# Patient Record
Sex: FEMALE | Race: WHITE | NOT HISPANIC OR LATINO | Employment: OTHER | URBAN - METROPOLITAN AREA
[De-identification: names, ages, dates, MRNs, and addresses within clinical notes are randomized per-mention and may not be internally consistent; named-entity substitution may affect disease eponyms.]

---

## 2018-05-02 ENCOUNTER — OFFICE VISIT (OUTPATIENT)
Dept: OTOLARYNGOLOGY | Facility: CLINIC | Age: 68
End: 2018-05-02
Payer: COMMERCIAL

## 2018-05-02 VITALS — BODY MASS INDEX: 30.78 KG/M2 | HEIGHT: 61 IN | WEIGHT: 163 LBS

## 2018-05-02 DIAGNOSIS — G51.39 HEMIFACIAL SPASM: Primary | ICD-10-CM

## 2018-05-02 ASSESSMENT — PAIN SCALES - GENERAL: PAINLEVEL: NO PAIN (0)

## 2018-05-02 NOTE — LETTER
5/2/2018       RE: Russell Rao  [address]  [facility] [address]     Dear Colleague,    Thank you for referring your patient, Russell Rao, to the [facility] EAR NOSE AND THROAT at [facility]. Please see a copy of my visit note below.    t    Facial Plastic and Reconstructive Surgery Consultation    Referring Provider:   Referred Self, MD  No address on file    HPI:   I had the pleasure of seeing Russell Rao today in clinic for consultation for sequelae from Bell's palsy and facial paralysis recovery.   Russell Rao is a 67 year old female who years ago experienced what is consistent on history with right Bell's palsy. She had slow recovery but was able to obtain a significant amount of facial movement.    She has since noted significant tightness on the right side of her face with unwanted movements. She specifically feels that her eye closes involuntarily and that the right side is uncomfortable. She tries to stretch her face but does not achieve much improvement.    She also notes twitching in her face and tightness in her neck. These are all problems that have been consistent concerns since her recovery from the total facial paralysis.       Review Of Systems  ROS: 10 point ROS neg other than the symptoms noted above in the HPI.    Patient Active Problem List   Diagnosis     Constipation     Migraine headache     Allergic state     S/P hysterectomy     Hyperlipidemia LDL goal <130     Advanced directives, counseling/discussion     Migraine     Obesity     Bell's palsy     Epigastric pain     Eyelid cancer     Major depression, single episode     Symptomatic cholelithiasis     Past Surgical History:   Procedure Laterality Date     HYSTERECTOMY, PAP NO LONGER INDICATED      TVH and prolift repair, ovaries remain     TUBAL LIGATION       Current Outpatient Prescriptions   Medication Sig Dispense Refill     Ibuprofen 200 MG capsule Take 200 mg by  mouth every 4 hours as needed.       LORATADINE 10 MG OR TABS ONE DAILY 90 Tab 3     Multiple Vitamins-Minerals (MULTIVITAL PO)        omega-3 fatty acids (FISH OIL) 1200 MG capsule Take 1 capsule by mouth daily.       simvastatin (ZOCOR) 20 MG tablet Take 1 tablet by mouth At Bedtime. 90 tablet 3     Pollen extract and Pollen [pollen extract]  Social History     Social History     Marital status: Single     Spouse name: N/A     Number of children: N/A     Years of education: N/A     Occupational History     Not on file.     Social History Main Topics     Smoking status: Former Smoker     Types: Cigarettes     Quit date: 2006     Smokeless tobacco: Never Used     Alcohol use No     Drug use: No     Sexual activity: Not Currently     Partners: Male     Birth control/ protection: None     Other Topics Concern     Parent/Sibling W/ Cabg, Mi Or Angioplasty Before 65f 55m? No     Social History Narrative    Dairy/d 1-2 servings/d.     Caffeine 1 servings/d    Exercise 0 x week    Sunscreen used - Yes    Seatbelts used - Yes    Working smoke/CO detectors in the home - Yes    Guns stored in the home - No    Self Breast Exams - Yes    Self Testicular Exam - NA    Eye Exam up to date - No    Dental Exam up to date - No    Pap Smear up to date - No    Mammogram up to date - No    PSA up to date - NA    Dexa Scan up to date - No    Flex Sig / Colonoscopy up to date - No    Immunizations up to date - Yes    Abuse: Current or Past(Physical, Sexual or Emotional)- No    Do you feel safe in your environment - Yes    MAURY Collado CMA                 Family History   Problem Relation Age of Onset     Breast Cancer Maternal Grandmother      Asthma Mother      OSTEOPOROSIS Mother      Hypertension Father      Cancer - colorectal Father      Alcohol/Drug Father      Alzheimer Disease Father      dementia     Hypertension Brother      CEREBROVASCULAR DISEASE Brother           Alcohol/Drug Brother      Lipids Brother       Genitourinary Problems Sister      kidney stones     Gynecology Sister      hyst     CEREBROVASCULAR DISEASE Brother        PE:  Alert and Oriented, Answering Questions Appropriately  Atraumatic, Normocephalic  Face: Notable resting tone asymmetry with a narrow right palpebral aperture and deep right nasolabial fold  Movement of the right corner of the mouth with closure of the eye  Involuntary closure of the eye with pursing of lips and speech  EOM's, PEERL  Skin: Awad 2  Facial Nerve Intact and facial movement symmetric  Chin: Normal   Neck: No lymphadenopathy, no thyromegaly, trachea midline  Chest: No wheezing, cyanosis, or stridor  Card: Normal upper extremity pulses and capillary refill, not diaphoretic  Neuro/Psych: CN's 2-12 intact except for right facial nerve findings, Moves all extremities, ambulation in intact, positive affect, no notable muscle weakness            IMPRESSION:    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   She has significant discomfort and symptoms and would benefit from treatment.   I have discussed chemodenervation with her today and discussed the risks and benefits of this treatment.  I believe that this combined with facial rehabilitation will lead to significant relief.     Photodocumentation was obtained.     I spent a total of 40 minutes face-to-face with Russell Rao during today's office visit.  Over 50% of this time was spent counseling the patient and/or coordinating care regarding her faical symptoms and their treatment.  See note for details.        Again, thank you for allowing me to participate in the care of your patient.      Sincerely,    Kezia Davis MD

## 2018-05-02 NOTE — MR AVS SNAPSHOT
"              After Visit Summary   5/2/2018    Russell Rao    MRN: [id number]           Patient Information     Date Of Birth          1950        Visit Information        Provider Department      5/2/2018 5:20 PM Kezia Davis MD M [facility] Ear Nose and Throat        Today's Diagnoses     Hemifacial spasm    -  1       Follow-ups after your visit        Your next 10 appointments already scheduled     Jun 06, 2018  5:00 PM CDT   (Arrive by 4:45 PM)   RETURN FACIAL PARALYSIS with MD SASHA Doherty [facility] Ear Nose and Throat ([facility])    [address] [address] [phone]              Who to contact     Please call your clinic at [phone] to:    Ask questions about your health    Make or cancel appointments    Discuss your medicines    Learn about your test results    Speak to your doctor            Additional Information About Your Visit        MyChart Information     Editlite gives you secure access to your electronic health record. If you see a primary care provider, you can also send messages to your care team and make appointments. If you have questions, please call your primary care clinic.  If you do not have a primary care provider, please call [phone] and they will assist you.      Editlite is an electronic gateway that provides easy, online access to your medical records. With [employer], you can request a clinic appointment, read your test results, renew a prescription or communicate with your care team.     To access your existing account, please contact your [facility] or call [phone] for assistance.        Care EveryWhere ID     This is your Care EveryWhere ID. This could be used by other organizations to access your [place] medical records  [id number]        Your Vitals Were     Height BMI (Body Mass Index)                1.54 m (5' 0.63\") 31.18 kg/m2           Blood " Pressure from Last 3 Encounters:   09/05/12 118/78   07/30/12 102/64   06/20/12 100/64    Weight from Last 3 Encounters:   05/02/18 73.9 kg (163 lb)   09/05/12 73.5 kg (162 lb 2 oz)   07/30/12 72.6 kg (160 lb)              Today, you had the following     No orders found for display       Primary Care Provider Office Phone # Fax #    Lelo Contreras PA-C [phone]       [address] [facility] [address]        Equal Access to Services     [facility]: Hadii sasha buchanan hadasho Soomaali, waaxda luqadaha, qaybta kaalmada adetala, sophie baumann . So [facility] [phone].    ATENCIÓN: Si habla español, tiene a barney disposición servicios gratuitos de asistencia lingüística. [facility] [phone].    We comply with applicable federal civil rights laws and [place] laws. We do not discriminate on the basis of race, color, national origin, age, disability, sex, sexual orientation, or gender identity.            Thank you!     Thank you for choosing [facility] EAR NOSE AND THROAT  for your care. Our goal is always to provide you with excellent care. Hearing back from our patients is one way we can continue to improve our services. Please take a few minutes to complete the written survey that you may receive in the mail after your visit with us. Thank you!             Your Updated Medication List - Protect others around you: Learn how to safely use, store and throw away your medicines at www.disposemymeds.org.          This list is accurate as of 5/2/18 11:59 PM.  Always use your most recent med list.                   Brand Name Dispense Instructions for use Diagnosis    ibuprofen 200 MG capsule      Take 200 mg by mouth every 4 hours as needed.        loratadine 10 MG tablet    CLARITIN    90 Tab    ONE DAILY    Allergies       MULTIVITAL PO           omega-3 fatty acids 1200 MG capsule      Take 1 capsule by mouth daily.        simvastatin 20 MG tablet    ZOCOR    90 tablet     Take 1 tablet by mouth At Bedtime.

## 2018-05-02 NOTE — NURSING NOTE
"Chief Complaint   Patient presents with     Consult     facial paralysis-bells Palsy      Height 1.54 m (5' 0.63\"), weight 73.9 kg (163 lb).    Erik Antoine LPN    "

## 2018-05-03 NOTE — NURSING NOTE
Video and photodocumentation obtained.  Will obtain PA for Botulinum Toxin for Synkinesis.    Rose Culver RN  5/3/2018 9:23 AM

## 2018-05-11 ENCOUNTER — TELEPHONE (OUTPATIENT)
Dept: OTOLARYNGOLOGY | Facility: CLINIC | Age: 68
End: 2018-05-11

## 2018-05-11 NOTE — TELEPHONE ENCOUNTER
Called patient to inform her that her insurance company does not require a prior auth for Botox for facial paralysis and synkinesis.  She would like to proceed and I scheduled her for June 6th.      Rose Culver RN  5/11/2018 10:35 AM

## 2018-06-06 ENCOUNTER — OFFICE VISIT (OUTPATIENT)
Dept: OTOLARYNGOLOGY | Facility: CLINIC | Age: 68
End: 2018-06-06
Payer: COMMERCIAL

## 2018-06-06 VITALS
DIASTOLIC BLOOD PRESSURE: 85 MMHG | BODY MASS INDEX: 30.58 KG/M2 | HEIGHT: 61 IN | WEIGHT: 162 LBS | SYSTOLIC BLOOD PRESSURE: 149 MMHG

## 2018-06-06 DIAGNOSIS — R13.11 ORAL PHASE DYSPHAGIA: Primary | ICD-10-CM

## 2018-06-06 DIAGNOSIS — G51.39 HEMIFACIAL SPASM: ICD-10-CM

## 2018-06-06 DIAGNOSIS — R47.1 DYSARTHRIA: ICD-10-CM

## 2018-06-06 ASSESSMENT — PAIN SCALES - GENERAL: PAINLEVEL: NO PAIN (0)

## 2018-06-06 NOTE — LETTER
6/6/2018       RE: Russell Rao  [address]  [facility] [address]     Dear Colleague,    Thank you for referring your patient, Russell Rao, to the [facility] EAR NOSE AND THROAT at [facility]. Please see a copy of my visit note below.    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  She has had  treatment with physical therapy and maximized those efforts. She was referred for chemo denervation with botulinum toxin and we discussed the risks and benefits associated.     Procedure: Chemodenervation with Botulinum Toxin A  Indication: Hemifacial Spasm and Hypercontracture  Injector: Dr. Kezia Davis    The skin was cleaned with antimicrobial solution and a topical ice mask was placed.   The patient was asked to systematically engage the muscles in the area to be injected. The tuberculin needles were used for subdermal injection and hemostasis was obtained with digital pressure when needed. The skin was cleaned.     A total of 28 units of Xeomin was injected subdermally. Areas treated were :lower lid, below brow, mentalis, platysma   Please see scanned procedure log.    The patient tolerated the procedure well and there were no complications.      Again, thank you for allowing me to participate in the care of your patient.      Sincerely,    Kezia Davis MD

## 2018-06-06 NOTE — MR AVS SNAPSHOT
"              After Visit Summary   6/6/2018    Russell Rao    MRN: [id number]           Patient Information     Date Of Birth          1950        Visit Information        Provider Department      6/6/2018 5:00 PM Kezia Davis MD [facility] Ear Nose and Throat        Today's Diagnoses     Oral phase dysphagia    -  1    Dysarthria        Hemifacial spasm           Follow-ups after your visit        Additional Services     SPEECH THERAPY REFERRAL       *This therapy referral will be filtered to a centralized scheduling office at [facility] and the patient will receive a call to schedule an appointment at a [place] location most convenient for them. *     [facility] provides Speech Therapy evaluation and treatment and many specialty services across the [place] system.  If requesting a specialty program, please choose from the list below.  If you have not heard from the scheduling office within 2 business days, please call [phone] for all locations, with the exception of [place], please call [phone].       Treatment: Evaluation & Treatment  Speech Treatment Diagnosis: Dysarthria and Oral Phase Dysphagia  Special Instructions: Ewelina Guzmán SLP  Special Programs: Facial Paralysis    Please be aware that coverage of these services is subject to the terms and limitations of your health insurance plan.  Call member services at your health plan with any benefit or coverage questions.      **Note to Provider:  If you are referring outside of [place] for the therapy appointment, please list the name of the location in the \"special instructions\" above, print the referral and give to the patient to schedule the appointment.                  Who to contact     Please call your clinic at [phone] to:    Ask questions about your health    Make or cancel appointments    Discuss your medicines    Learn about your test results    Speak to your doctor         " "   Additional Information About Your Visit        Hang w/hart Information     At[employer] gives you secure access to your electronic health record. If you see a primary care provider, you can also send messages to your care team and make appointments. If you have questions, please call your primary care clinic.  If you do not have a primary care provider, please call [phone] and they will assist you.      Atterocor is an electronic gateway that provides easy, online access to your medical records. With [employer], you can request a clinic appointment, read your test results, renew a prescription or communicate with your care team.     To access your existing account, please contact your [facility] or call [phone] for assistance.        Care EveryWhere ID     This is your Care EveryWhere ID. This could be used by other organizations to access your [place] medical records  [id number]        Your Vitals Were     Height BMI (Body Mass Index)                1.54 m (5' 0.63\") 30.98 kg/m2           Blood Pressure from Last 3 Encounters:   06/06/18 149/85   09/05/12 118/78   07/30/12 102/64    Weight from Last 3 Encounters:   06/06/18 73.5 kg (162 lb)   05/02/18 73.9 kg (163 lb)   09/05/12 73.5 kg (162 lb 2 oz)              We Performed the Following     BOTULINUM TOXIN A PER UNIT     CHEMODENERVATION, FACE NERVE MUSCLE     SPEECH THERAPY REFERRAL        Primary Care Provider Office Phone # Fax #    Lelo Vee Contreras PA-C [phone]       [facility] [address]        Equal Access to Services     [facility] : Hadii sasha ku hadasho Soalanali, waaxda luqadaha, qaybta kaalmada adeegyada, sophie best. So [facility] [phone].    ATENCIÓN: Si habla español, tiene a barney disposición servicios gratuitos de asistencia lingüística. Llame al [phone].    We comply with applicable federal civil rights laws and [place] laws. We do not " discriminate on the basis of race, color, national origin, age, disability, sex, sexual orientation, or gender identity.            Thank you!     Thank you for choosing [facility] EAR NOSE AND THROAT  for your care. Our goal is always to provide you with excellent care. Hearing back from our patients is one way we can continue to improve our services. Please take a few minutes to complete the written survey that you may receive in the mail after your visit with us. Thank you!             Your Updated Medication List - Protect others around you: Learn how to safely use, store and throw away your medicines at www.disposemymeds.org.          This list is accurate as of 6/6/18  6:12 PM.  Always use your most recent med list.                   Brand Name Dispense Instructions for use Diagnosis    ibuprofen 200 MG capsule      Take 200 mg by mouth every 4 hours as needed.        loratadine 10 MG tablet    CLARITIN    90 Tab    ONE DAILY    Allergies       MULTIVITAL PO           omega-3 fatty acids 1200 MG capsule      Take 1 capsule by mouth daily.        simvastatin 20 MG tablet    ZOCOR    90 tablet    Take 1 tablet by mouth At Bedtime.

## 2018-06-06 NOTE — NURSING NOTE
Chief Complaint   Patient presents with     RECHECK     bell's palsy, still has pain     Rebel Nielsen

## 2018-06-06 NOTE — PROGRESS NOTES
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  She has had  treatment with physical therapy and maximized those efforts. She was referred for chemo denervation with botulinum toxin and we discussed the risks and benefits associated.     Procedure: Chemodenervation with Botulinum Toxin A  Indication: Hemifacial Spasm and Hypercontracture  Injector: Dr. Kezia Davis    The skin was cleaned with antimicrobial solution and a topical ice mask was placed.   The patient was asked to systematically engage the muscles in the area to be injected. The tuberculin needles were used for subdermal injection and hemostasis was obtained with digital pressure when needed. The skin was cleaned.     A total of 28 units of Xeomin was injected subdermally. Areas treated were :lower lid, below brow, mentalis, platysma   Please see scanned procedure log.    The patient tolerated the procedure well and there were no complications.

## 2018-06-06 NOTE — NURSING NOTE
Obtained consent for Botulinum Toxin.  See treatment sheet.  Patient will see me in 2 weeks for follow up photos.    Referral placed to Ewelina Guzmán - Facial Rehab Specialist.        PREPROCEDURE:  Botulinum Toxin  Yes Patient ID verified with 2 identifiers (name and  or MRN)  Yes: Procedure and site verified with patient/designee  Yes: Accurate consent documentation in medical record  Yes: Marking not required. Reason: Provider is in continuous attendance with the patient from consent through completion of procedure.     TIME OUT:  Yes: Time-Out performed immediately prior to starting procedure, including verbal and active participation of all team members addressing:  * Correct patient identity  * Confirmed that the correct side and site are marked  * An accurate procedure consent form  * Agreement on the procedure to be done  * Correct patient position  * Relevant images and results are properly labeled and appropriately displayed  * The need to administer antibiotics or fluids for irrigation purposes during the procedure as applicable  * Safety precations based on patient history or medication use    DURING PROCEDURE: Verification of correct person, site, and procedure occurs any time the responsibility for care of the patient is transferred to another member of the care team.               Rose Culver RN  2018 5:16 PM

## 2018-07-11 ENCOUNTER — THERAPY VISIT (OUTPATIENT)
Dept: SPEECH THERAPY | Facility: CLINIC | Age: 68
End: 2018-07-11
Attending: OTOLARYNGOLOGY
Payer: COMMERCIAL

## 2018-07-11 DIAGNOSIS — R13.11 ORAL PHASE DYSPHAGIA: ICD-10-CM

## 2018-07-11 DIAGNOSIS — R47.1 DYSARTHRIA: ICD-10-CM

## 2018-07-11 NOTE — MR AVS SNAPSHOT
After Visit Summary   7/11/2018    Russell Rao    MRN: [id number]           Patient Information     Date Of Birth          1950        Visit Information        Provider Department      7/11/2018 5:00 PM Verónica Guzmán SLP [facility]        Today's Diagnoses     Oral phase dysphagia        Dysarthria           Follow-ups after your visit        Who to contact     Please call your clinic at [phone] to:    Ask questions about your health    Make or cancel appointments    Discuss your medicines    Learn about your test results    Speak to your doctor            Additional Information About Your Visit        MyChart Information     [employer] gives you secure access to your electronic health record. If you see a primary care provider, you can also send messages to your care team and make appointments. If you have questions, please call your primary care clinic.  If you do not have a primary care provider, please call [phone] and they will assist you.      [employer] is an electronic gateway that provides easy, online access to your medical records. With [employer], you can request a clinic appointment, read your test results, renew a prescription or communicate with your care team.     To access your existing account, please contact your [facility] or call [phone] for assistance.        Care EveryWhere ID     This is your Care EveryWhere ID. This could be used by other organizations to access your [place] medical records  [id number]         Blood Pressure from Last 3 Encounters:   06/06/18 149/85   09/05/12 118/78   07/30/12 102/64    Weight from Last 3 Encounters:   06/06/18 73.5 kg (162 lb)   05/02/18 73.9 kg (163 lb)   09/05/12 73.5 kg (162 lb 2 oz)              Today, you had the following     No orders found for display       Primary Care Provider Office Phone # Fax #    Lelo Contreras PA-C [phone]       [address] [place] [address]  IRA  [facility] [address]        Equal Access to Services     [facility] : Hadii aad ku hadjuliojuan Moneali, waaramda luqadaha, qamauriliota kaminniesophie yoon. So [facility] [phone].    ATENCIÓN: Si habla español, tiene a barney disposición servicios gratuitos de asistencia lingüística. Llame al [phone].    We comply with applicable federal civil rights laws and [place] laws. We do not discriminate on the basis of race, color, national origin, age, disability, sex, sexual orientation, or gender identity.            Thank you!     Thank you for choosing [facility]  for your care. Our goal is always to provide you with excellent care. Hearing back from our patients is one way we can continue to improve our services. Please take a few minutes to complete the written survey that you may receive in the mail after your visit with us. Thank you!             Your Updated Medication List - Protect others around you: Learn how to safely use, store and throw away your medicines at www.disposemymeds.org.          This list is accurate as of 7/11/18  6:19 PM.  Always use your most recent med list.                   Brand Name Dispense Instructions for use Diagnosis    ibuprofen 200 MG capsule      Take 200 mg by mouth every 4 hours as needed.        loratadine 10 MG tablet    CLARITIN    90 Tab    ONE DAILY    Allergies       MULTIVITAL PO           omega-3 fatty acids 1200 MG capsule      Take 1 capsule by mouth daily.        simvastatin 20 MG tablet    ZOCOR    90 tablet    Take 1 tablet by mouth At Bedtime.

## 2018-07-11 NOTE — PROGRESS NOTES
" Speech Pathology/Facial Paralysis Evaluation - 07/11/18 1700   Visit Type   Visit Type Initial   Patient Type   Patient Type Adult   General Patient Information   Start Of Care Date 07/11/18   Referring Physician Kezia Davis MD   Orders Eval And Treat   Orders Date 06/06/18   Medical Diagnosis Dysarthria; Oral Phase Dysphagia   Onset Of Illness/injury Or Date Of Surgery (Approximately November, 2012)   Surgical/Medical History Reviewed Yes   Pertinent History Of Current Problem Per patient, \"Went to ER, r/o stroke, they told me I have Bell's Palsy (right), they gave me steroids and an antiviral, I always had where the mouth and I go together (synkinesis), it took a couple weeks for the eye to close. I had Botox in 6/6/18 and now it doesn't close as much when I eat and drink.\" Current concerns: \"my eye is down\" and dealing with synkinesis, facial tightness.   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 Patient has been doing forehead and smile exercises which unfortunately reinforce syninesis, encouraged to discontinue in favor of those given today.    Evaluation Results: Resting Tone and Symmetry/Oral status   Palpebral Fissure - Type of Eye Tone  Wide  (1 mm)   Nasolabial Fold  More Pronounced   Lips  - Type of Lip Tone  Elevated  (slightly)   Chin  - Type of Chin Tone  [place]  (slightly)   Type of Forehead Wrinkles Less   Type of Eye Wrinkles More    Type of Cheek/Mouth Wrinkles More   Oral " Rest Posture WNL  (Patient reports wearing dentures.)   Evaluation Results: Forehead Elevation   Forehead Strength Rating % 75-80%   Forehead - Synkinesis  Zygomaticus;Mentalis   Forehead General Severity of Synkinesis   (slight)   Evaluation Results: Minimal Effort Eye Closure    Complete Yes   Eye Closure Synkinesis   Zygomaticus;Mentalis;Platysma   General Severity of Synkinesis   (slight to mild)   Evaluation Results: Open Mouth Smile    Open Smile Strength Rating % 85%   Open Smile Synkinesis   Orbicularis Occuli;Platysma   Open Smile General Severity of Synkinesis   (slight)   Evaluation Results: Closed Mouth Smile    Closed Mouth Smile Strength Rating % 97%   Closed Mouth Smile Synkinesis   Orbicularis Occuli;Platysma   Closed Mouth Smile General Severity of Synkinesis   (very slight)   Evaluation Results: Snarl   Snarl Strength Rating % 75-80%   Snarl Synkinesis  Orbicularis Occuli;Mentalis   Snarl General Severity of Synkinesis   (slight)   Evaluation Results: Pucker   Strength Rating % 50%   Pucker Synkinesis   Orbicularis Occuli;Risorius;Mentalis   General Severity of Synkinesis   mild   Evaluation Results: Tongue Movement   Tongue Protrusion WNL   Presence of Synkinesis with Lingual Movements Mild to moderate in orbicularis oculi   Evaluation Results: Speech Function   Evaluation Results: Speech Function (Impaired nonverbal communication - see Facial Grading Scorin)   General Severity of Synkinesis with Sound-Lip Rounding None   General Severity of Synkinesis with Sound-Lip Pressure none   General Therapy Interventions   Planned Therapy Interventions Facial Therapy;Improve Facial Tone and Function;Other  (Reduce synkinesis)   Clinical Impressions   Criteria for Skilled Therapeutic Interventions Met yes;treatment indicated   Facial Grading Score 70.8/100   Communication Diagnosis Dysarthria   Swallowing Diagnosis Mild to moderate synkinesis with labial and lingual movments   Rehab Potential good to  achieve stated therapy goal(s)   Therapy Frequency  1 time;per month   Predicted Duration of Therapy Intervention (days/weeks) 9 months or less as indicated  (Option to reduce frequency of visits)   Risks and Benefits of Treatment have been explained yes   Patient, family and/or staff in agreement no   Facial Paralysis Goals   Facial Paralysis Goals 1;2;3   Facial Paralysis Goal 1   Goal Identifier Facial Comfort   Goal Description Patient will report a 25% increase in facial comfort/reduction in facial tightness as compared to status noted on date of evaluation.    Target Date 09/11/18   Facial Paralysis Goal 2   Goal Identifier Facial Function (Reduction in Synkinesis)  for Speech, Oral Phase Swallowing and Nonverbal Communication   Goal Description Patient will demonstrate a 10 point gain on her Facial Grading Score, per therapist judgement, reflecting gains in orofacial resting tone, voluntary movement and minimization of synkinesis if present.    Target Date 09/11/18   Education Assessment   Preferred Learning Style Listening;Reading;Demonstration;Pictures/video   Barriers to Learning No barriers   Total Evaluation Time   Total Evaluation Time 45

## 2018-09-10 ENCOUNTER — TELEPHONE (OUTPATIENT)
Dept: OTOLARYNGOLOGY | Facility: CLINIC | Age: 68
End: 2018-09-10

## 2018-10-03 ENCOUNTER — OFFICE VISIT (OUTPATIENT)
Dept: OTOLARYNGOLOGY | Facility: CLINIC | Age: 68
End: 2018-10-03
Payer: COMMERCIAL

## 2018-10-03 VITALS — BODY MASS INDEX: 30.58 KG/M2 | HEIGHT: 61 IN | WEIGHT: 162 LBS

## 2018-10-03 DIAGNOSIS — H53.483 VISUAL FIELD CONSTRICTION, BILATERAL: ICD-10-CM

## 2018-10-03 DIAGNOSIS — G51.39 HEMIFACIAL SPASM: Primary | ICD-10-CM

## 2018-10-03 NOTE — LETTER
10/3/2018       RE: Russell Rao  [address]  [facility] [address]     Dear Colleague,    Thank you for referring your patient, Russell Rao, to the [facility] EAR NOSE AND THROAT at [facility]. Please see a copy of my visit note below.    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  She has had this treatment in june with significant therapeutic effect and improvement and is now 4 months from treatment.  It is timely that she necessitate another treatment with chemodenervation today.     In addition she has significant complaints associated with visual obstruction  Russell describes upper lids interfering with superior visual field and interfering with activities of daily living including reading, driving and watching television. This is quite bothersome.     EXAM:    MRD1: Right eye 0   Left eye 0  Dermatochalasis with excess skin touching the eyelids  Brow ptosis with brow resting below the superior orbital rim  Lids resting on eyelashes obstructing the temporal visual axis          Procedure: Chemodenervation with Botulinum Toxin A  Indication: Hemifacial Spasm and Hypercontracture  Injector: Dr. Kezia Davis    The skin was cleaned with antimicrobial solution and a topical ice mask was placed.   The patient was asked to systematically engage the muscles in the area to be injected. The tuberculin needles were used for subdermal injection and hemostasis was obtained with digital pressure when needed. The skin was cleaned.     A total of 31.5 units was injected subdermally.   Please see scanned procedure log.    The patient tolerated the procedure well and there were no complications.    Assessment and Plan:    Dermatochalasis of upper eyelids    Refer for visual field testing  I believe she would be a good surgical  candidate, the excess skin and laxity with her facial weakness make her quite symptomatic.    I spent a total of 10 minutes face-to-face with Russell Rao during today's office visit.  Over 50% of this time was spent counseling the patient and/or coordinating care regarding visual obstruction.  See note for details.        Kezia Davis MD

## 2018-10-03 NOTE — NURSING NOTE
"Chief Complaint   Patient presents with     RECHECK     bells palsy     Height 1.549 m (5' 1\"), weight 73.5 kg (162 lb).    Jagdeep Smith    "

## 2018-10-03 NOTE — NURSING NOTE
Patient tolerated Botulinum Toxin procedure well - see treatment sheet.  Will coordinate for patient to have VFT done.    Patient to follow up with me after she has had VF done.    Rose Culver RN  10/3/2018 6:18 PM

## 2018-10-03 NOTE — MR AVS SNAPSHOT
"              After Visit Summary   10/3/2018    Russell Rao    MRN: [id number]           Patient Information     Date Of Birth          1950        Visit Information        Provider Department      10/3/2018 5:00 PM Kezia Davis MD [facility] Ear Nose and Throat        Today's Diagnoses     Hemifacial spasm    -  1    Visual field constriction, bilateral           Follow-ups after your visit        Your next 10 appointments already scheduled     Jan 02, 2019  5:20 PM CST   (Arrive by 5:05 PM)   Return Visit with MD SASHA Doherty [facility] Ear Nose and Throat ([facility])    [address] [address] [phone]              Who to contact     Please call your clinic at [phone] to:    Ask questions about your health    Make or cancel appointments    Discuss your medicines    Learn about your test results    Speak to your doctor            Additional Information About Your Visit        MyChart Information     [facility] gives you secure access to your electronic health record. If you see a primary care provider, you can also send messages to your care team and make appointments. If you have questions, please call your primary care clinic.  If you do not have a primary care provider, please call [phone] and they will assist you.      [facility] is an electronic gateway that provides easy, online access to your medical records. With [facility], you can request a clinic appointment, read your test results, renew a prescription or communicate with your care team.     To access your existing account, please contact your [facility] or call [phone] for assistance.        Care EveryWhere ID     This is your Care EveryWhere ID. This could be used by other organizations to access your Advance medical records  [id number]        Your Vitals Were     Height BMI (Body Mass Index)                1.549 m (5' 1\") " 30.61 kg/m2           Blood Pressure from Last 3 Encounters:   06/06/18 149/85   09/05/12 118/78   07/30/12 102/64    Weight from Last 3 Encounters:   10/03/18 73.5 kg (162 lb)   06/06/18 73.5 kg (162 lb)   05/02/18 73.9 kg (163 lb)              We Performed the Following     CHEMODENERVATION, FACE NERVE MUSCLE     Xeomin Injection Hemifacial Spasm        Primary Care Provider Office Phone # Fax #    Lelo Vee Contreras PA-C [phone] [address] [facility] [address]        Equal Access to Services     [facility] : Hadii sasha Romo, waaxda luaryanadaha, qaybta kaalmada david, sophie best. So [facility] [phone].    ATENCIÓN: Si habla español, tiene a barney disposición servicios gratuitos de asistencia lingüística. Ll[facility] [phone].    We comply with applicable federal civil rights laws and [place] laws. We do not discriminate on the basis of race, color, national origin, age, disability, sex, sexual orientation, or gender identity.            Thank you!     Thank you for choosing [facility] EAR NOSE AND THROAT  for your care. Our goal is always to provide you with excellent care. Hearing back from our patients is one way we can continue to improve our services. Please take a few minutes to complete the written survey that you may receive in the mail after your visit with us. Thank you!             Your Updated Medication List - Protect others around you: Learn how to safely use, store and throw away your medicines at www.disposemymeds.org.          This list is accurate as of 10/3/18 11:59 PM.  Always use your most recent med list.                   Brand Name Dispense Instructions for use Diagnosis    ibuprofen 200 MG capsule    ADVIL/MOTRIN     Take 200 mg by mouth every 4 hours as needed.        loratadine 10 MG tablet    CLARITIN    90 Tab    ONE DAILY    Allergies       MULTIVITAL PO           omega-3 fatty acids 1200 MG capsule       Take 1 capsule by mouth daily.        simvastatin 20 MG tablet    ZOCOR    90 tablet    Take 1 tablet by mouth At Bedtime.

## 2018-10-10 ENCOUNTER — TELEPHONE (OUTPATIENT)
Dept: OPHTHALMOLOGY | Facility: CLINIC | Age: 68
End: 2018-10-10

## 2018-10-10 NOTE — TELEPHONE ENCOUNTER
Left VM for patient to call me back to schedule PVF for kaushik sprague    Shannanelli Fisher October 10, 2018 10:17 AM

## 2018-10-15 ENCOUNTER — ALLIED HEALTH/NURSE VISIT (OUTPATIENT)
Dept: OPHTHALMOLOGY | Facility: CLINIC | Age: 68
End: 2018-10-15
Payer: COMMERCIAL

## 2018-10-15 DIAGNOSIS — H02.413 MECHANICAL PTOSIS OF EYELID OF BOTH EYES: Primary | ICD-10-CM

## 2018-11-25 PROBLEM — H53.483 VISUAL FIELD CONSTRICTION, BILATERAL: Status: ACTIVE | Noted: 2018-11-25

## 2018-11-26 NOTE — PROGRESS NOTES
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  She has had this treatment in june with significant therapeutic effect and improvement and is now 4 months from treatment.  It is timely that she necessitate another treatment with chemodenervation today.     In addition she has significant complaints associated with visual obstruction  Russell describes upper lids interfering with superior visual field and interfering with activities of daily living including reading, driving and watching television. This is quite bothersome.     EXAM:    MRD1: Right eye 0   Left eye 0  Dermatochalasis with excess skin touching the eyelids  Brow ptosis with brow resting below the superior orbital rim  Lids resting on eyelashes obstructing the temporal visual axis          Procedure: Chemodenervation with Botulinum Toxin A  Indication: Hemifacial Spasm and Hypercontracture  Injector: Dr. Kezia Davis    The skin was cleaned with antimicrobial solution and a topical ice mask was placed.   The patient was asked to systematically engage the muscles in the area to be injected. The tuberculin needles were used for subdermal injection and hemostasis was obtained with digital pressure when needed. The skin was cleaned.     A total of 31.5 units was injected subdermally.   Please see scanned procedure log.    The patient tolerated the procedure well and there were no complications.    Assessment and Plan:    Dermatochalasis of upper eyelids    Refer for visual field testing  I believe she would be a good surgical candidate, the excess skin and laxity with her facial weakness make her quite symptomatic.    I spent a total of 10 minutes face-to-face with Russell Rao during today's office visit.  Over 50% of this time was spent counseling the patient and/or coordinating care regarding visual obstruction.   See note for details.

## 2018-12-18 ENCOUNTER — DOCUMENTATION ONLY (OUTPATIENT)
Dept: OTOLARYNGOLOGY | Facility: CLINIC | Age: 68
End: 2018-12-18

## 2018-12-18 DIAGNOSIS — H53.40 VISUAL FIELD DEFECT: ICD-10-CM

## 2018-12-18 DIAGNOSIS — H02.839 DERMATOCHALASIS: Primary | ICD-10-CM

## 2019-01-09 ENCOUNTER — OFFICE VISIT (OUTPATIENT)
Dept: OTOLARYNGOLOGY | Facility: CLINIC | Age: 69
End: 2019-01-09
Payer: COMMERCIAL

## 2019-01-09 ENCOUNTER — THERAPY VISIT (OUTPATIENT)
Dept: SPEECH THERAPY | Facility: CLINIC | Age: 69
End: 2019-01-09
Payer: COMMERCIAL

## 2019-01-09 DIAGNOSIS — R13.11 ORAL PHASE DYSPHAGIA: Primary | ICD-10-CM

## 2019-01-09 DIAGNOSIS — G51.39 HEMIFACIAL SPASM: Primary | ICD-10-CM

## 2019-01-09 DIAGNOSIS — R47.89 OTHER SPEECH DISTURBANCES: ICD-10-CM

## 2019-01-09 DIAGNOSIS — R47.1 DYSARTHRIA: ICD-10-CM

## 2019-01-09 NOTE — PROGRESS NOTES
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  She has had this treatment in right with significant therapeutic effect and improvement and is now 3 months from treatment.  It is timely that she necessitate another treatment with chemodenervation today.     Procedure: Chemodenervation with Botulinum Toxin A  Indication: Hemifacial Spasm and Hypercontracture  Injector: Dr. Kezia Davis    The skin was cleaned with antimicrobial solution and a topical ice mask was placed.   The patient was asked to systematically engage the muscles in the area to be injected. The tuberculin needles were used for subdermal injection and hemostasis was obtained with digital pressure when needed. The skin was cleaned.     A total of 40 units was injected subdermally.   Please see scanned procedure log.    The patient tolerated the procedure well and there were no complications.

## 2019-01-09 NOTE — LETTER
1/9/2019       RE: Russell Rao  [address]  [facility] [address]     Dear Colleague,    Thank you for referring your patient, Russell Rao, to the [facility] EAR NOSE AND THROAT at [facility]. Please see a copy of my visit note below.    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  She has had this treatment in right with significant therapeutic effect and improvement and is now 3 months from treatment.  It is timely that she necessitate another treatment with chemodenervation today.     Procedure: Chemodenervation with Botulinum Toxin A  Indication: Hemifacial Spasm and Hypercontracture  Injector: Dr. Kezia Davis    The skin was cleaned with antimicrobial solution and a topical ice mask was placed.   The patient was asked to systematically engage the muscles in the area to be injected. The tuberculin needles were used for subdermal injection and hemostasis was obtained with digital pressure when needed. The skin was cleaned.     A total of 40 units was injected subdermally.   Please see scanned procedure log.    The patient tolerated the procedure well and there were no complications.      Again, thank you for allowing me to participate in the care of your patient.      Sincerely,    Kezia Davis MD

## 2019-01-10 NOTE — NURSING NOTE
Updated photodocumentation obtained - see media tab.     Obtained consent for Botulinum Toxin.  Discussed possible side effects including but not limited to unwanted facial weakness or paralysis, bruising, swelling, redness, pain, upper eyelid ptosis.  Botox will begin to work in 4 - 5 days, fully working by 10 days.  Botox lasts for approximately 3 months.      Prepped skin with isopropyl alcohol and gauze, intermittent ice for comfort.  Patient tolerated procedure well.      Prior auth for upper eyelid blepharoplasty has been submitted.      Patient to follow up in 3 months for Botox.      Rose Culver RN  1/9/2019 6:55 PM

## 2019-01-18 ENCOUNTER — TELEPHONE (OUTPATIENT)
Dept: OTOLARYNGOLOGY | Facility: CLINIC | Age: 69
End: 2019-01-18

## 2019-01-18 NOTE — TELEPHONE ENCOUNTER
Patient is scheduled for surgery with Dr. Davis.  Spoke or left message with: Patient  Date of Surgery: 2/21/19  Location: [facility]  Informed patient they will need an adult  Yes  Pre-op with surgeon (if applicable): N/A  H&P: Scheduled with PCP @ [facility]  Additional imaging/appointments: N/A  Surgery packet: Mailed 1/18/19  Additional comments: Postop appt 3/6/19

## 2019-01-25 DIAGNOSIS — G51.0 FACIAL PARALYSIS: ICD-10-CM

## 2019-01-25 DIAGNOSIS — R13.10 DYSPHAGIA: ICD-10-CM

## 2019-01-25 DIAGNOSIS — R47.89 OTHER SPEECH DISTURBANCE: Primary | ICD-10-CM

## 2019-01-25 DIAGNOSIS — R47.1 DYSARTHRIA: ICD-10-CM

## 2019-01-28 NOTE — PROGRESS NOTES
"   Speech Pathology Facial Paralysis Re-Evaluation - 01/09/19 1600   Signing Clinician's Name / Credentials   Signing clinician's name /credentials CANELO Webster, SLP   Session Number   Session Number 1   Quick Add   Facial Paralysis Facial Paralysis   Subjective Report   Subjective Report \"I had Botox today and they're looking into lifting the eyelids. She did the neck, lips eye. The eye doesn't close when I move my lips, that's the big difference. I went to my eye doctor and my cornea is good. I did the exercises for a while and then I got away from them. People see a difference. \"   Resting Symmetry Location    Palpebral Fissure Eye Tone Normal   Nasolabial Fold More Pronounced   Lips Elevated  (Slightly, improved somewhat)   Voluntary Movement: Minimal Effort Eye Closure    Minimal Effort Eye Closure Complete Yes   Minimal Effort Eye Closure-Synkinesis Levators  (significantly reduced)   General Severity of Synkinesis (slight)   Voluntary Movement: Forehead   Forehead Elevation  Strength Rating % 75-80%   Forehead-Synkinesis Zygomaticus;Mentalis   General Severity of Synkinesis (slight)   Voluntary Movement: Open Mouth Smile   Open Mouth Smile Strength Rating% 85%   Open Mouth Smile-Synkinesis Orbicularis Occuli;Platysma   General Severity of Synkinesis (slight to mild)   Voluntary Movement: Closed Mouth Smile   Closed Mouth Smile Strength Rating % 90%   Closed Mouth Smile-Synkinesis Orbicularis Occuli;Platysma   General Severity of Synkinesis (slight)   Voluntary Movement: Snarl   Snarl Strength Rating % 75-80%   Snarl-Synkinesis Orbicularis Occuli;Platysma   General Severity of Synkinesis (Mild)   Voluntary Movement: Pucker   Pucker Strength Rating % 65%  (improved)   Pucker-Synkinesis Orbicularis Occuli;Mentalis   General Severity of Synkinesis (slight, improved)   Facial Paralysis Goals   Facial Paralysis Goals 1;2;3   Facial Paralysis Goal 1   Goal Identifier Facial Comfort   Goal Description Patient " "will report a 25% increase in facial comfort/reduction in facial tightness as compared to status noted on date of evaluation.   (Pt states she feels completely comfortable now. )   Target Date 09/11/18   Date Met 01/09/19   Facial Paralysis Goal 2   Goal Identifier Facial Function (Reduction in Synkinesis)  for Speech, Oral Phase Swallowing and Nonverbal Communication   Goal Description Patient will demonstrate a 10 point gain on her Facial Grading Score, per therapist judgement, reflecting gains in orofacial resting tone, voluntary movement and minimization of synkinesis if present.   (Increased 5.8 points)   Target Date 04/09/19   Treatment Interventions    Treatment Interventions Treatment Speech/Lang/Voice   Treatment Speech/Lang/Voice   GROUP Language & Auditory Processing Therapy Minutes (95692) 45   Skilled Intervention (Reasssessed facial status. Instructed in facial strategies. )   Patient Response Verbalized and demostrated understanding.    Treatment Detail Completed Facial Grading Scoring. Reviewed and practiced wheel massage and introduced cheek pinches, pulls etc but told pt not to do any massage until 10 days to 2 weeks have passed since current Botox treatment. Instructed in addtional strengthening exercises, see below, and practiced them.   (Tongue sweep ex with eyes shift, creep up technique)   Strengthening   Facial Relaxation Active   Pucker Active;Include Eye Shift  (\"Creep up\" technique)   Lip Press Active;Include Eye Shift  (\"Creep up\" technique)   Lower Lip Depression Active;Include Eye Shift  (\"Creep up\" technique)   Snarl Plus Smile Active Assisted   Assessments Completed   Facial Grading Score 76.6/100   Education   Learner Patient   Readiness Eager   Method Booklet/handout;Explanation;Demonstration   Response Verbalizes understanding;Demonstrates understanding   Plan   Home program See Treatment Detail and Strengthening sections above.    Plan for next session Reassess status and advance " home practice program as indicated.    Total Session Time   Timed Code Treatment Minutes 45   AMBULATORY CLINICS ONLY-MEDICAL AND TREATMENT DIAGNOSIS   Medical Diagnosis Dysarthria; Oral Phase Dysphagia; Other Speech Disturbances   Communication Diagnosis Dysarthria; Other Speech Disturbances

## 2019-02-19 ENCOUNTER — ANESTHESIA EVENT (OUTPATIENT)
Dept: SURGERY | Facility: AMBULATORY SURGERY CENTER | Age: 69
End: 2019-02-19

## 2019-02-20 NOTE — ANESTHESIA PREPROCEDURE EVALUATION
"Anesthesia Pre-Procedure Evaluation    Patient: Russell Rao   MRN:     [id number] Gender:   female   Age:    68 year old :      1950        Preoperative Diagnosis: Dermatochalasis, Visual Field Obstruction   Procedure(s):  Bilateral Upper Eyelid Blepharoplasty     Past Medical History:   Diagnosis Date     Allergic rhinitis      Arthritis      Migraine headaches 2009     Other and unspecified hyperlipidemia      PONV (postoperative nausea and vomiting)       Past Surgical History:   Procedure Laterality Date     HYSTERECTOMY, PAP NO LONGER INDICATED      TVH and prolift repair, ovaries remain     TUBAL LIGATION                 [facility] AN PHYSICAL EXAM    Lab Results   Component Value Date    WBC 6.6 2006    HGB 10.2 (L) 2007    HCT 37.5 2006     2006     2006    POTASSIUM 3.5 2006    CHLORIDE 111 (H) 2006    CO2 23 2006    BUN 13 2006    CR 0.56 (L) 2006    GLC 98 2010    KLEVER 9.1 2006    ALBUMIN 3.9 2006    PROTTOTAL 7.5 2006    ALT 17 10/11/2011    AST 20 2006    ALKPHOS 78 2006    BILITOTAL 0.3 2006    TSH 0.81 2010       Preop Vitals  BP Readings from Last 3 Encounters:   18 149/85   12 118/78   12 102/64    Pulse Readings from Last 3 Encounters:   12 72   12 68   12 64      Resp Readings from Last 3 Encounters:   12 12   12 12   09 16    SpO2 Readings from Last 3 Encounters:   No data found for SpO2      Temp Readings from Last 1 Encounters:   12 36.6  C (97.9  F) (Oral)    Ht Readings from Last 1 Encounters:   10/03/18 1.549 m (5' 1\")      Wt Readings from Last 1 Encounters:   10/03/18 73.5 kg (162 lb)    Estimated body mass index is 30.61 kg/m  as calculated from the following:    Height as of 10/3/18: 1.549 m (5' 1\").    Weight as of 10/3/18: 73.5 kg (162 lb).     LDA:            Assessment:   ASA SCORE: 2    NPO " Status: > 6 hours since completed Solid Foods; > 2 hours since completed Clear Liquids   Documentation: H&P complete; Preop Testing complete; Consents complete   Proceeding: Proceed without further delay  Tobacco Use:  Active user of Tobacco     Plan:   Anes. Type:  MAC   Pre-Induction: Midazolam IV   Induction:  IV (Standard)   Airway: Native Airway   Access/Monitoring: PIV   Maintenance: Propofol; IV   Emergence: Procedure Site   Logistics: Same Day Surgery     Postop Pain/Sedation Strategy:  Standard-Options: Opioids PRN     PONV Management:  Adult Risk Factors: Female, H/o PONV or Motion Sickness, Postop Opioids  Prevention: Propofol Infusion; Dexamethasone; Ondansetron     CONSENT: Direct conversation   Plan and risks discussed with: Patient          Comments for Plan/Consent:  MAC + LOCAL                     ANESTHESIA PREOP EVALUATION    PROCEDURE: Procedure(s):  Bilateral Upper Eyelid Blepharoplasty    HPI: Russell Rao is a 68 year old female who presents for above procedure 2/    PAST MEDICAL HISTORY:    Past Medical History:   Diagnosis Date     Allergic rhinitis      Arthritis      Migraine headaches 2009     Other and unspecified hyperlipidemia      PONV (postoperative nausea and vomiting)        PAST SURGICAL HISTORY:    Past Surgical History:   Procedure Laterality Date     HYSTERECTOMY, PAP NO LONGER INDICATED      TVH and prolift repair, ovaries remain     TUBAL LIGATION         PAST ANESTHESIA HISTORY:     No personal or family h/o anesthesia problems    SOCIAL HISTORY:       Social History     Tobacco Use     Smoking status: Former Smoker     Types: Cigarettes     Last attempt to quit: 2006     Years since quittin.0     Smokeless tobacco: Never Used   Substance Use Topics     Alcohol use: No       ALLERGIES:     Allergies   Allergen Reactions     Pollen Extract      Other reaction(s): Unknown     Pollen [Pollen Extract]        MEDICATIONS:       (Not in a hospital  admission)    Current Outpatient Medications   Medication Sig Dispense Refill     Ibuprofen 200 MG capsule Take 200 mg by mouth every 4 hours as needed.       LORATADINE 10 MG OR TABS ONE DAILY 90 Tab 3     Multiple Vitamins-Minerals (MULTIVITAL PO)        omega-3 fatty acids (FISH OIL) 1200 MG capsule Take 1 capsule by mouth daily.       simvastatin (ZOCOR) 20 MG tablet Take 1 tablet by mouth At Bedtime. 90 tablet 3       Current Outpatient Medications Ordered in Epic   Medication Sig Dispense Refill     Ibuprofen 200 MG capsule Take 200 mg by mouth every 4 hours as needed.       LORATADINE 10 MG OR TABS ONE DAILY 90 Tab 3     Multiple Vitamins-Minerals (MULTIVITAL PO)        omega-3 fatty acids (FISH OIL) 1200 MG capsule Take 1 capsule by mouth daily.       simvastatin (ZOCOR) 20 MG tablet Take 1 tablet by mouth At Bedtime. 90 tablet 3     No current Epic-ordered facility-administered medications on file.        PHYSICAL EXAM:    Vitals: T Data Unavailable, P Data Unavailable, BP Data Unavailable, R Data Unavailable, SpO2  , Weight Wt Readings from Last 2 Encounters:   10/03/18 73.5 kg (162 lb)   06/06/18 73.5 kg (162 lb)     See doc flowsheet    NPO STATUS: see doc flowsheet    LABS:    BMP:  No results for input(s): NA, POTASSIUM, CHLORIDE, CO2, BUN, CR, GLC, KLEVER in the last 69267 hours.    LFTs:   Recent Labs   Lab Test 10/11/11  1003   ALT 17       CBC:   No results for input(s): WBC, RBC, HGB, HCT, MCV, MCH, MCHC, RDW, PLT in the last 43866 hours.    Coags:  No results for input(s): INR, PTT, FIBR in the last 89168 hours.    Imaging:  No orders to display       Thierno Burgos MD  Anesthesiology Staff  Pager [phone]    2/19/2019  8:06 PM        Thierno Burgos MD

## 2019-02-21 ENCOUNTER — ANESTHESIA (OUTPATIENT)
Dept: SURGERY | Facility: AMBULATORY SURGERY CENTER | Age: 69
End: 2019-02-21

## 2019-02-21 ENCOUNTER — HOSPITAL ENCOUNTER (OUTPATIENT)
Facility: AMBULATORY SURGERY CENTER | Age: 69
End: 2019-02-21
Attending: OTOLARYNGOLOGY
Payer: COMMERCIAL

## 2019-02-21 VITALS
DIASTOLIC BLOOD PRESSURE: 71 MMHG | HEART RATE: 67 BPM | RESPIRATION RATE: 16 BRPM | WEIGHT: 162 LBS | SYSTOLIC BLOOD PRESSURE: 129 MMHG | TEMPERATURE: 97.5 F | HEIGHT: 62 IN | BODY MASS INDEX: 29.81 KG/M2 | OXYGEN SATURATION: 95 %

## 2019-02-21 DIAGNOSIS — H53.483 VISUAL FIELD CONSTRICTION, BILATERAL: Primary | ICD-10-CM

## 2019-02-21 RX ORDER — CEFAZOLIN SODIUM 2 G/50ML
2 SOLUTION INTRAVENOUS
Status: COMPLETED | OUTPATIENT
Start: 2019-02-21 | End: 2019-02-21

## 2019-02-21 RX ORDER — MEPERIDINE HYDROCHLORIDE 25 MG/ML
12.5 INJECTION INTRAMUSCULAR; INTRAVENOUS; SUBCUTANEOUS
Status: DISCONTINUED | OUTPATIENT
Start: 2019-02-21 | End: 2019-02-22 | Stop reason: HOSPADM

## 2019-02-21 RX ORDER — ONDANSETRON 2 MG/ML
INJECTION INTRAMUSCULAR; INTRAVENOUS PRN
Status: DISCONTINUED | OUTPATIENT
Start: 2019-02-21 | End: 2019-02-21

## 2019-02-21 RX ORDER — PROPOFOL 10 MG/ML
INJECTION, EMULSION INTRAVENOUS CONTINUOUS PRN
Status: DISCONTINUED | OUTPATIENT
Start: 2019-02-21 | End: 2019-02-21

## 2019-02-21 RX ORDER — OXYCODONE HYDROCHLORIDE 5 MG/1
5 TABLET ORAL EVERY 4 HOURS PRN
Status: DISCONTINUED | OUTPATIENT
Start: 2019-02-21 | End: 2019-02-22 | Stop reason: HOSPADM

## 2019-02-21 RX ORDER — ERYTHROMYCIN 5 MG/G
OINTMENT OPHTHALMIC PRN
Status: DISCONTINUED | OUTPATIENT
Start: 2019-02-21 | End: 2019-02-21 | Stop reason: HOSPADM

## 2019-02-21 RX ORDER — ACETAMINOPHEN 325 MG/1
650 TABLET ORAL ONCE
Status: COMPLETED | OUTPATIENT
Start: 2019-02-21 | End: 2019-02-21

## 2019-02-21 RX ORDER — ERYTHROMYCIN 5 MG/G
0.5 OINTMENT OPHTHALMIC 3 TIMES DAILY
Qty: 3.5 G | Refills: 1 | Status: SHIPPED | OUTPATIENT
Start: 2019-02-21 | End: 2019-03-03

## 2019-02-21 RX ORDER — FENTANYL CITRATE 50 UG/ML
INJECTION, SOLUTION INTRAMUSCULAR; INTRAVENOUS PRN
Status: DISCONTINUED | OUTPATIENT
Start: 2019-02-21 | End: 2019-02-21

## 2019-02-21 RX ORDER — LIDOCAINE 40 MG/G
CREAM TOPICAL
Status: DISCONTINUED | OUTPATIENT
Start: 2019-02-21 | End: 2019-02-21 | Stop reason: HOSPADM

## 2019-02-21 RX ORDER — NALOXONE HYDROCHLORIDE 0.4 MG/ML
.1-.4 INJECTION, SOLUTION INTRAMUSCULAR; INTRAVENOUS; SUBCUTANEOUS
Status: DISCONTINUED | OUTPATIENT
Start: 2019-02-21 | End: 2019-02-22 | Stop reason: HOSPADM

## 2019-02-21 RX ORDER — LIDOCAINE HYDROCHLORIDE 20 MG/ML
INJECTION, SOLUTION INFILTRATION; PERINEURAL PRN
Status: DISCONTINUED | OUTPATIENT
Start: 2019-02-21 | End: 2019-02-21

## 2019-02-21 RX ORDER — SODIUM CHLORIDE, SODIUM LACTATE, POTASSIUM CHLORIDE, CALCIUM CHLORIDE 600; 310; 30; 20 MG/100ML; MG/100ML; MG/100ML; MG/100ML
INJECTION, SOLUTION INTRAVENOUS CONTINUOUS
Status: DISCONTINUED | OUTPATIENT
Start: 2019-02-21 | End: 2019-02-21 | Stop reason: HOSPADM

## 2019-02-21 RX ORDER — DEXAMETHASONE SODIUM PHOSPHATE 4 MG/ML
INJECTION, SOLUTION INTRA-ARTICULAR; INTRALESIONAL; INTRAMUSCULAR; INTRAVENOUS; SOFT TISSUE PRN
Status: DISCONTINUED | OUTPATIENT
Start: 2019-02-21 | End: 2019-02-21

## 2019-02-21 RX ORDER — SODIUM CHLORIDE, SODIUM LACTATE, POTASSIUM CHLORIDE, CALCIUM CHLORIDE 600; 310; 30; 20 MG/100ML; MG/100ML; MG/100ML; MG/100ML
INJECTION, SOLUTION INTRAVENOUS CONTINUOUS
Status: DISCONTINUED | OUTPATIENT
Start: 2019-02-21 | End: 2019-02-22 | Stop reason: HOSPADM

## 2019-02-21 RX ORDER — ONDANSETRON 2 MG/ML
4 INJECTION INTRAMUSCULAR; INTRAVENOUS EVERY 30 MIN PRN
Status: DISCONTINUED | OUTPATIENT
Start: 2019-02-21 | End: 2019-02-22 | Stop reason: HOSPADM

## 2019-02-21 RX ORDER — KETOROLAC TROMETHAMINE 30 MG/ML
INJECTION, SOLUTION INTRAMUSCULAR; INTRAVENOUS PRN
Status: DISCONTINUED | OUTPATIENT
Start: 2019-02-21 | End: 2019-02-21

## 2019-02-21 RX ORDER — CEFAZOLIN SODIUM 1 G/50ML
1 SOLUTION INTRAVENOUS SEE ADMIN INSTRUCTIONS
Status: DISCONTINUED | OUTPATIENT
Start: 2019-02-21 | End: 2019-02-21 | Stop reason: HOSPADM

## 2019-02-21 RX ORDER — ACETAMINOPHEN 325 MG/1
650 TABLET ORAL
Status: DISCONTINUED | OUTPATIENT
Start: 2019-02-21 | End: 2019-02-22 | Stop reason: HOSPADM

## 2019-02-21 RX ORDER — PROPOFOL 10 MG/ML
INJECTION, EMULSION INTRAVENOUS PRN
Status: DISCONTINUED | OUTPATIENT
Start: 2019-02-21 | End: 2019-02-21

## 2019-02-21 RX ORDER — ONDANSETRON 4 MG/1
4 TABLET, ORALLY DISINTEGRATING ORAL EVERY 30 MIN PRN
Status: DISCONTINUED | OUTPATIENT
Start: 2019-02-21 | End: 2019-02-22 | Stop reason: HOSPADM

## 2019-02-21 RX ADMIN — FENTANYL CITRATE 25 MCG: 50 INJECTION, SOLUTION INTRAMUSCULAR; INTRAVENOUS at 12:02

## 2019-02-21 RX ADMIN — SODIUM CHLORIDE, SODIUM LACTATE, POTASSIUM CHLORIDE, CALCIUM CHLORIDE: 600; 310; 30; 20 INJECTION, SOLUTION INTRAVENOUS at 10:37

## 2019-02-21 RX ADMIN — ACETAMINOPHEN 650 MG: 325 TABLET ORAL at 10:35

## 2019-02-21 RX ADMIN — ONDANSETRON 4 MG: 2 INJECTION INTRAMUSCULAR; INTRAVENOUS at 12:02

## 2019-02-21 RX ADMIN — PROPOFOL 25 MCG/KG/MIN: 10 INJECTION, EMULSION INTRAVENOUS at 12:02

## 2019-02-21 RX ADMIN — DEXAMETHASONE SODIUM PHOSPHATE 4 MG: 4 INJECTION, SOLUTION INTRA-ARTICULAR; INTRALESIONAL; INTRAMUSCULAR; INTRAVENOUS; SOFT TISSUE at 12:02

## 2019-02-21 RX ADMIN — LIDOCAINE HYDROCHLORIDE 80 MG: 20 INJECTION, SOLUTION INFILTRATION; PERINEURAL at 12:02

## 2019-02-21 RX ADMIN — KETOROLAC TROMETHAMINE 15 MG: 30 INJECTION, SOLUTION INTRAMUSCULAR; INTRAVENOUS at 12:28

## 2019-02-21 RX ADMIN — CEFAZOLIN SODIUM 2 G: 2 SOLUTION INTRAVENOUS at 12:00

## 2019-02-21 RX ADMIN — PROPOFOL 30 MG: 10 INJECTION, EMULSION INTRAVENOUS at 12:02

## 2019-02-21 ASSESSMENT — MIFFLIN-ST. JEOR: SCORE: 1223.08

## 2019-02-21 NOTE — BRIEF OP NOTE
[facility]    Brief Operative Note    Pre-operative diagnosis: Dermatochalasis, Visual Field Obstruction  Post-operative diagnosis * No post-op diagnosis entered *  Procedure: Procedure(s):  Bilateral Upper Eyelid Blepharoplasty  Surgeon: Surgeon(s) and Role:     * Kezia Davis MD - Primary  Anesthesia: Combined MAC with Local   Estimated blood loss: * No values recorded between 2/21/2019 12:16 PM and 2/21/2019 12:40 PM *  Drains: None  Specimens: * No specimens in log *  Findings:   Please see full operative report for details.  Complications: None.  Implants: None.

## 2019-02-21 NOTE — H&P
Otolaryngology H&P Note-H&P update, please see H&P from 2019 with Hallie Vance  2019    HPI: Russell Rao is a 68 year old female who is presenting today for surgery with Dr. Davis. She is scheduled to undergo bilateral upper lid blepharoplasty. She reports feeling well today with no chest pain or palpations and no shortness of breath. She denies any changes since she had her original H&P on 2019.     Past Medical History:   Diagnosis Date     Allergic rhinitis      Arthritis      Migraine headaches 2009     Other and unspecified hyperlipidemia      PONV (postoperative nausea and vomiting)        Past Surgical History:   Procedure Laterality Date     HYSTERECTOMY, PAP NO LONGER INDICATED      TVH and prolift repair, ovaries remain     TUBAL LIGATION         Current Outpatient Medications   Medication Sig Dispense Refill     Ibuprofen 200 MG capsule Take 200 mg by mouth every 4 hours as needed.       LORATADINE 10 MG OR TABS ONE DAILY 90 Tab 3     Multiple Vitamins-Minerals (MULTIVITAL PO)        omega-3 fatty acids (FISH OIL) 1200 MG capsule Take 1 capsule by mouth daily.       simvastatin (ZOCOR) 20 MG tablet Take 1 tablet by mouth At Bedtime. 90 tablet 3          Allergies   Allergen Reactions     Pollen Extract      Other reaction(s): Unknown     Pollen [Pollen Extract]        Social History     Socioeconomic History     Marital status: Single     Spouse name: Not on file     Number of children: Not on file     Years of education: Not on file     Highest education level: Not on file   Occupational History     Not on file   Social Needs     Financial resource strain: Not on file     Food insecurity:     Worry: Not on file     Inability: Not on file     Transportation needs:     Medical: Not on file     Non-medical: Not on file   Tobacco Use     Smoking status: Former Smoker     Types: Cigarettes     Last attempt to quit: 2006     Years since quittin.0      Smokeless tobacco: Never Used   Substance and Sexual Activity     Alcohol use: No     Drug use: No     Sexual activity: Not Currently     Partners: Male     Birth control/protection: None   Lifestyle     Physical activity:     Days per week: Not on file     Minutes per session: Not on file     Stress: Not on file   Relationships     Social connections:     Talks on phone: Not on file     Gets together: Not on file     Attends [employer] service: Not on file     Active member of club or organization: Not on file     Attends meetings of clubs or organizations: Not on file     Relationship status: Not on file     Intimate partner violence:     Fear of current or ex partner: Not on file     Emotionally abused: Not on file     Physically abused: Not on file     Forced sexual activity: Not on file   Other Topics Concern     Parent/sibling w/ CABG, MI or angioplasty before 65F 55M? No   Social History Narrative    Dairy/d 1-2 servings/d.     Caffeine 1 servings/d    Exercise 0 x week    Sunscreen used - Yes    Seatbelts used - Yes    Working smoke/CO detectors in the home - Yes    Guns stored in the home - No    Self Breast Exams - Yes    Self Testicular Exam - NA    Eye Exam up to date - No    Dental Exam up to date - No    Pap Smear up to date - No    Mammogram up to date - No    PSA up to date - NA    Dexa Scan up to date - No    Flex Sig / Colonoscopy up to date - No    Immunizations up to date - Yes    Abuse: Current or Past(Physical, Sexual or Emotional)- No    Do you feel safe in your environment - Yes    MAURY Collado CMA                   Family History   Problem Relation Age of Onset     Breast Cancer Maternal Grandmother      Asthma Mother      Osteoporosis Mother      Hypertension Father      Cancer - colorectal Father      Alcohol/Drug Father      Alzheimer Disease Father         dementia     Hypertension Brother      Cerebrovascular Disease Brother              Alcohol/Drug Brother      Lipids Brother       "Genitourinary Problems Sister         kidney stones     Gynecology Sister         hyst     Cerebrovascular Disease Brother        PHYSICAL EXAM:  General: laying in bed, no acute distress  /81   Pulse 67   Temp 97.6  F (36.4  C) (Oral)   Resp 17   Ht 1.575 m (5' 2\")   Wt 73.5 kg (162 lb)   SpO2 93%   BMI 29.63 kg/m    HEAD: normocephalic, atraumatic  Face: no swelling, edema, or erythema  Eyes: EOMI without spontaneous or gaze evoked nystagmus, PERRL, clear sclera  Ears: no tragal tenderness, external ear canal open and clear bilaterally  Nose: no anterior drainage  Mouth: moist  Oropharynx: tonsils within normal limits, uvula midline, no oropharyngeal erythema  Neck: no LAD, trach midline  Neuro: cranial nerves 2-12 grossly intact,  Besides some right CN VII asymmetry (chronic)  Respiratory: Breathing non-labored, no stridor, no accessory muscle use. Lungs are clear to auscultation bilaterally.   Cardiovascular: regular rate and rhythm.     Review Of Systems  Skin: negative for, rash, bruising  Eyes: negative for, double vision, eye pain  Ears/Nose/Throat: negative for, vertigo, epistaxis  Respiratory: No shortness of breath, dyspnea on exertion, cough, or hemoptysis  Cardiovascular: negative for, palpitations, irregular heart beat and dyspnea on exertion  Gastrointestinal: negative for, nausea and vomiting  Genitourinary: negative  Musculoskeletal: negative for, back pain and neck pain  Neurologic: negative for and seizures  Psychiatric: negative  Hematologic/Lymphatic/Immunologic: negative for and bleeding disorder  Endocrine: negative for, cold intolerance and heat intolerance      ROUTINE IP LABS (Last four results)  BMPNo lab results found in last 7 days.  CBCNo lab results found in last 7 days.  INRNo lab results found in last 7 days.    Assessment/plan:   Russell Rao is a 68 year old female who is presenting today for surgery with Dr. Davis. She is scheduled to undergo bilateral " upper lid blepharoplasty. She reports feeling well today with no chest pain or palpations and no shortness of breath. She denies any changes since she had her original H&P on 1/21/2019.     Muna Kearney MD PGY-3  Otolaryngology-Head & Neck Surgery  Please page ENT with questions by dialing [phone] and entering job code [phone] when prompted.

## 2019-02-21 NOTE — ANESTHESIA CARE TRANSFER NOTE
Patient: Russell Rao    Procedure(s):  Bilateral Upper Eyelid Blepharoplasty    Diagnosis: Dermatochalasis, Visual Field Obstruction  Diagnosis Additional Information: No value filed.    Anesthesia Type:   No value filed.     Note:  Airway :Room Air  Patient transferred to:Phase II  Comments:   Transferred to:Phase II      Patient vital signs: stable    Airway: none    Monitors and alarms on; PIV intact and patent; in recliner; awake; report given to RN.     120/66, 64,24,94%,97.5      Cynthia Mitchell CRNA, APRN    2/21/2019 12:45 PM Handoff Report: Identifed the Patient, Identified the Reponsible Provider, Reviewed the pertinent medical history, Discussed the surgical course, Reviewed Intra-OP anesthesia mangement and issues during anesthesia, Set expectations for post-procedure period and Allowed opportunity for questions and acknowledgement of understanding      Vitals: (Last set prior to Anesthesia Care Transfer)    CRNA VITALS  2/21/2019 1209 - 2/21/2019 1245      2/21/2019             Pulse:  69    SpO2:  93 %    Resp Rate (set):  10                Electronically Signed By: SEBASTIAN Morrison CRNA  February 21, 2019  12:45 PM

## 2019-02-21 NOTE — DISCHARGE INSTRUCTIONS
"[facility]  Home Care Following Anesthesia  For 24 hours after surgery:  1. Get plenty of rest.  A responsible adult must stay with you for at least 24 hours after you leave the surgery center.  2. Do not drive or use heavy equipment.  If you have weakness or tingling, don't drive or use heavy equipment until this feeling goes away.   3. Do not drink alcohol.   4. Avoid strenuous or risky activities.  Ask for help when climbing stairs.  5. You may feel lightheaded.  IF so, sit for a few minutes before standing.  Have someone help you get up.   6. If you have nausea (feel sick to your stomach): Drink only clear liquids such as apple juice, ginger ale, broth or 7-Up.  Rest may also help.  Be sure to drink enough fluids.  Move to a regular diet as you feel able.   7. You may have a slight fever.  Call the doctor if your fever is over 100 F (37.7 C) (taken under the tongue) or lasts longer than 24 hours.  8. You may have a dry mouth, a sore throat, muscle aches or trouble sleeping. These should go away after 24 hours.  9. Do not make important or legal decisions.        Today you received a Marcaine or bupivacaine block to numb the nerves near your surgery site.  This is a block using local anesthetic or \"numbing\" medication injected around the nerves to anesthetize or \"numb\" the area supplied by those nerves.  This block is injected into the muscle layer near your surgical site.  The medication may numb the location where you had surgery for 6-18 hours, but may last up to 24 hours.  If your surgical site is an arm or leg you should be careful with your affected limb, since it is possible to injure your limb without being aware of it due to the numbing.  Until full feeling returns, you should guard against bumping or hitting your limb, and avoid extreme hot or cold temperatures on the skin.  As the block wears off, the feeling will return as a tingling or prickly sensation near your " surgical site.  You will experience more discomfort from your incision as the feeling returns.  You may want to take a pain pill (a narcotic or Tylenol if this was prescribed by your surgeon) when you start to experience mild pain before the pain beccomes more severe.  If your pain medications do not control your pain you should notifiy your surgeon.    Tips for taking pain medications  To get the best pain relief possible, remember these points:    Take pain medications as directed, before pain becomes severe.    Pain medication can upset your stomach: taking it with food may help.    Constipation is a common side effect of pain medication. Drink plenty of  fluids.    Eat foods high in fiber. Take a stool softener if recommended by your doctor or pharmacist.    Do not drink alcohol, drive or operate machinery while taking pain medications.    Ask about other ways to control pain, such as with heat, ice or relaxation.    Tylenol/Acetaminophen Consumption  To help encourage the safe use of acetaminophen, the makers of TYLENOL  have lowered the maximum daily dose for single-ingredient Extra Strength TYLENOL  (acetaminophen) products sold in the [place] from 8 pills per day (4,000 mg) to 6 pills per day (3,000 mg). The dosing interval has also changed from 2 pills every 4-6 hours to 2 pills every 6 hours.    If you feel your pain relief is insufficient, you may take Tylenol/Acetaminophen in addition to your narcotic pain medication.     Be careful not to exceed 3,000 mg of Tylenol/Acetaminophen in a 24 hour period from all sources.    If you are taking extra strength Tylenol/acetaminophen (500 mg), the maximum dose is 6 tablets in 24 hours.    If you are taking regular strength acetaminophen (325 mg), the maximum dose is 9 tablets in 24 hours.    Call a doctor for any of the followin. Signs of infection (fever, growing tenderness at the surgery site, a large amount of drainage or bleeding, severe pain, foul-smelling  drainage, redness, swelling).  2. It has been over 8 to 10 hours since surgery and you are still not able to urinate (pass water).  3. Headache for over 24 hours.  4. Numbness, tingling or weakness the day after surgery (if you had spinal anesthesia).  Your doctor is:  Dr. Kezia Davis, Otolaryngology: [phone]                    Or dial [phone] and ask for the resident on call for:  ENT Otolaryngology  For emergency care, call the:  [facility]:  [phone] (TTY for hearing impaired: [phone])

## 2019-02-21 NOTE — ANESTHESIA POSTPROCEDURE EVALUATION
Anesthesia POST Procedure Evaluation    Patient: Russell Rao   MRN:     [id number] Gender:   female   Age:    68 year old :      1950        Preoperative Diagnosis: Dermatochalasis, Visual Field Obstruction   Procedure(s):  Bilateral Upper Eyelid Blepharoplasty   Postop Comments: No value filed.       Anesthesia Type:  MAC    Reportable Event: NO     PAIN: Uncomplicated   Sign Out status: Comfortable, Well controlled pain     PONV: No PONV   Sign Out status:  No Nausea or Vomiting     Neuro/Psych: Uneventful perioperative course   Sign Out Status: Preoperative baseline; Age appropriate mentation     Airway/Resp.: Uneventful perioperative course   Sign Out Status: Non labored breathing, age appropriate RR; Resp. Status within EXPECTED Parameters     CV: Uneventful perioperative course   Sign Out status: Appropriate BP and perfusion indices; Appropriate HR/Rhythm     Disposition:   Sign Out in:  Phase II  Disposition:  Home  Recovery Course: Uneventful  Follow-Up: Not required           Last Anesthesia Record Vitals:  CRNA VITALS  2019 1209 - 2019 1309      2019             Pulse:  69    SpO2:  93 %    Resp Rate (set):  10          Last PACU/Preop Vitals:  Vitals:    19 1000 19 1243 19 1300   BP: 141/81 120/66 129/71   Pulse: 67     Resp: 17 16 16   Temp: 36.4  C (97.6  F) 36.4  C (97.5  F)    SpO2: 93% 93% 95%         Electronically Signed By: Thierno Burgos MD, 2019

## 2019-02-22 NOTE — OP NOTE
Procedure Date: 02/21/2019      ATTENDING SURGEON:  Dr. Zeina Davis       ASSISTANT SURGEON: Muna Giordano, resident.        OPERATIONS PERFORMED:  Bilateral upper eyelid blepharoplasty.      PREOPERATIVE DIAGNOSES:   1.  Bilateral dermatochalasis.   2.  Bilateral visual field defect.   3.  History of facial paralysis with partial recovery.      ANESTHESIA:  Monitored anesthesia care (MAC).      ESTIMATED BLOOD LOSS:  Less than 5 mL.      COMPLICATIONS: None.       INDICATIONS:  Russell Rao is a 68-year-old female with a history of a right idiopathic facial paralysis.  She has had partial recovery from the facial paralysis, who has also developed significant dermatochalasia that has led to visual field obstruction.  This is well documented clinically in her preoperative assessment.  Given this finding, she was a candidate for a bilateral upper lid blepharoplasty.  After thorough discussion of risks, benefits and alternatives, the patient opted to go forward with the above-mentioned procedure.      DESCRIPTION OF PROCEDURE:  The patient was brought back to the operating room, placed supine on the operating table.  Monitoring lines were placed as appropriate and MAC anesthesia was induced by the anesthesia team.  A timeout was taken per hospital policy and all were in agreement.  Several pinch tests were performed bilaterally to assess the redundancy in the skin and planned incisions were made.  The right excision was conservative due to her facial paralysis. The incisions were made, ensuring that adequate skin was left behind to allow for full eye closure.  The final skin incisions were marked and injected with 2% lidocaine with 1:200,000 epinephrine.  We started with the left side and a #15 blade scalpel was used to make an incision through the dermis.  High temperature cautery was then used to elevate this off the orbicularis oculi, taking up only a skin flap.  The orbicularis was maintained bilaterally.   Hemostasis was obtained, and the skin was then closed using a 6-0 fast gut suture in a running fashion.  The same procedure was then performed on the right side.  Ointment was then applied to bilateral upper eyelid incisions.  The patient was then turned over the care of the anesthesia team awakened and brought to PACU in stable condition.  The patient had a full eye closure at the end of the case.      Dr. Park was present and scrubbed for the entirety of the procedure.         STEPHENIE PARK MD       As dictated by AJIT ROMERO MD        I was present, scrubbed for the entire procedure and performed key aspects. I agree with the note.     STEPHENIE PARK MD          D: 2019   T: 2019   MT: CESAR      Name:     JERI GONSALEZ   MRN:      [id number]        Account:        [id number]   :      1950           Procedure Date: 2019      Document: [id number]

## 2019-03-13 ENCOUNTER — DOCUMENTATION ONLY (OUTPATIENT)
Dept: CARE COORDINATION | Facility: CLINIC | Age: 69
End: 2019-03-13

## 2019-04-10 ENCOUNTER — OFFICE VISIT (OUTPATIENT)
Dept: OTOLARYNGOLOGY | Facility: CLINIC | Age: 69
End: 2019-04-10
Payer: COMMERCIAL

## 2019-04-10 VITALS
WEIGHT: 162 LBS | HEIGHT: 62 IN | DIASTOLIC BLOOD PRESSURE: 70 MMHG | BODY MASS INDEX: 29.81 KG/M2 | SYSTOLIC BLOOD PRESSURE: 135 MMHG

## 2019-04-10 DIAGNOSIS — G51.39 HEMIFACIAL SPASM: Primary | ICD-10-CM

## 2019-04-10 ASSESSMENT — MIFFLIN-ST. JEOR: SCORE: 1223.08

## 2019-04-10 NOTE — LETTER
4/10/2019     RE: Russell Rao  [address]  [facility] [address]     Dear Colleague,    Thank you for referring your patient, Russell Rao, to the [facility] at [facility]. Please see a copy of my visit note below.    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    Procedure: Chemodenervation with Botulinum Toxin A  Indication: Hemifacial Spasm and Hypercontracture  Injector: Kezia Davis    Informed consent was obtained.  The skin was cleaned with antimicrobial solution and a topical ice was placed.     The patient was asked to systematically engage the muscles in the area to be injected. The tuberculin needles were used for subdermal injection and hemostasis was obtained with light digital pressure when needed. The skin was cleaned.     Side Treated: Right  A total of 40 units were injected.  Botulinum toxin A   Please see procedure log.    The patient tolerated the procedure well and there were no complications. Post procedure care instructions were given to the patient.    Again, thank you for allowing me to participate in the care of your patient.      Sincerely,  Kezia Davis MD

## 2019-04-10 NOTE — PROGRESS NOTES
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    Procedure: Chemodenervation with Botulinum Toxin A  Indication: Hemifacial Spasm and Hypercontracture  Injector: Kezia Davis      Informed consent was obtained.  The skin was cleaned with antimicrobial solution and a topical ice was placed.     The patient was asked to systematically engage the muscles in the area to be injected. The tuberculin needles were used for subdermal injection and hemostasis was obtained with light digital pressure when needed. The skin was cleaned.     Side Treated: Right  A total of 40 units were injected.  Botulinum toxin A   Please see procedure log.    The patient tolerated the procedure well and there were no complications. Post procedure care instructions were given to the patient.

## 2019-07-10 ENCOUNTER — OFFICE VISIT (OUTPATIENT)
Dept: OTOLARYNGOLOGY | Facility: CLINIC | Age: 69
End: 2019-07-10
Payer: COMMERCIAL

## 2019-07-10 DIAGNOSIS — G51.39 HEMIFACIAL SPASM: Primary | ICD-10-CM

## 2019-07-10 NOTE — PROGRESS NOTES
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 Treatment with physical therapy has been maximized and the patient is referred for chemodenervation with botulinum toxin, The risks and benefits of the procedure were discussed.      Procedure: Chemodenervation with Botulinum Toxin A  Indication: Hemifacial Spasm and Hypercontracture  Injector: Kezia Davis      Informed consent was obtained.  The skin was cleaned with antimicrobial solution and a topical ice was placed.     The patient was asked to systematically engage the muscles in the area to be injected. The tuberculin needles were used for subdermal injection and hemostasis was obtained with light digital pressure when needed. The skin was cleaned.     A total of 37 units were injected.  Botulinum toxin A type: Botox    Please see procedure log.    The patient tolerated the procedure well and there were no complications. Post procedure care instructions were given to the patient.

## 2019-07-10 NOTE — LETTER
7/10/2019       RE: Russell Rao  [address]  [facility] [address]     Dear Colleague,    Thank you for referring your patient, Russell Rao, to the [facility] EAR NOSE AND THROAT at [facility]. Please see a copy of my visit note below.    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 Treatment with physical therapy has been maximized and the patient is referred for chemodenervation with botulinum toxin, The risks and benefits of the procedure were discussed.      Procedure: Chemodenervation with Botulinum Toxin A  Indication: Hemifacial Spasm and Hypercontracture  Injector: Kezia Davis      Informed consent was obtained.  The skin was cleaned with antimicrobial solution and a topical ice was placed.     The patient was asked to systematically engage the muscles in the area to be injected. The tuberculin needles were used for subdermal injection and hemostasis was obtained with light digital pressure when needed. The skin was cleaned.     A total of 37 units were injected.  Botulinum toxin A type: Botox    Please see procedure log.    The patient tolerated the procedure well and there were no complications. Post procedure care instructions were given to the patient.      Again, thank you for allowing me to participate in the care of your patient.      Sincerely,    Kezia Davis MD

## 2019-09-09 ENCOUNTER — DOCUMENTATION ONLY (OUTPATIENT)
Dept: CARE COORDINATION | Facility: CLINIC | Age: 69
End: 2019-09-09

## 2019-09-28 ENCOUNTER — HEALTH MAINTENANCE LETTER (OUTPATIENT)
Age: 69
End: 2019-09-28

## 2019-11-20 ENCOUNTER — OFFICE VISIT (OUTPATIENT)
Dept: OTOLARYNGOLOGY | Facility: CLINIC | Age: 69
End: 2019-11-20
Payer: COMMERCIAL

## 2019-11-20 VITALS — WEIGHT: 150 LBS | BODY MASS INDEX: 27.44 KG/M2

## 2019-11-20 DIAGNOSIS — H53.483 VISUAL FIELD CONTRACTION, BILATERAL: ICD-10-CM

## 2019-11-20 DIAGNOSIS — G51.39 HEMIFACIAL SPASM: Primary | ICD-10-CM

## 2019-11-20 DIAGNOSIS — H57.819 BROW PTOSIS: ICD-10-CM

## 2019-11-20 ASSESSMENT — PAIN SCALES - GENERAL: PAINLEVEL: NO PAIN (0)

## 2019-11-20 NOTE — LETTER
11/20/2019       RE: Russell Rao  [address]  [facility] [address]     Dear Colleague,    Thank you for referring your patient, Russell Rao, to the [facility] EAR NOSE AND THROAT at [facility]. Please see a copy of my visit note below.    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 Treatment with physical therapy has been maximized and the patient is referred for chemodenervation with botulinum toxin, The risks and benefits of the procedure were discussed.      The patient also presents with significant visual field obstruction.  She requires elevating her brows consistently to be able to see effectively.  She feels like this is significantly worse at night when she is tired and cannot make such much effort.  She has not had visual field testing but she does demonstrate manually with her hands at the corner of her eyes if she elevates her skin she can see better.  This is become an issue for reading and driving and other activities primarily towards the late end of the day evening.    Physical examination demonstrates stable right-sided facial synkinesis and hemifacial spasm.  She is ocular oral synkinesis.  As described previously.  She also has significant brow ptosis bilaterally.  The brows are below the supraorbital rim bilaterally.  When elevated manually to the appropriate anatomic position she notes significant improvement in her vision.  She also has excess upper eyelid skin which is contacting the eyelashes.      Procedure: Chemodenervation with Botulinum Toxin A  Indication: Hemifacial Spasm and Hypercontracture  Injector: Kezia Davis MD      Informed consent was obtained.  The skin was cleaned with antimicrobial solution and a topical ice was placed.     The patient was asked to systematically engage the muscles in  the area to be injected. The tuberculin needles were used for subdermal injection and hemostasis was obtained with light digital pressure when needed. The skin was cleaned.     Side Treated: Right  A total of 45 units were injected.  Botulinum toxin A type: Botox    Please see procedure log.    The patient tolerated the procedure well and there were no complications. Post procedure care instructions were given to the patient.    Assessment plan:  Botox treatment today for hemifacial spasm.  She is getting good results from it so we will continue.    We will refer the patient for visual field testing.  I think she is a good candidate for pretracheal brow lift in addition to bilateral blepharoplasty think this would improve her vision significantly.    I spent a total of 20 minutes face-to-face with Russlel Rao during today's office visit not including the injection.  Over 50% of this time was spent counseling the patient and/or coordinating care regarding visual obstruction.  See note for details.        Again, thank you for allowing me to participate in the care of your patient.      Sincerely,    Kezia Davis MD

## 2019-11-20 NOTE — NURSING NOTE
Chief Complaint   Patient presents with     RECHECK     3 month return, Botox     Weight 68 kg (150 lb).    Edith Sosa, EMT

## 2019-11-20 NOTE — NURSING NOTE
Obtained consent for Botulinum Toxin.  Discussed possible side effects including but not limited to unwanted facial weakness or paralysis, bruising, swelling, redness, pain, upper eyelid ptosis.  Botox will begin to work in 4 - 5 days, fully working by 10 days.  Botox lasts for approximately 3 months.      Prepped skin with isopropyl alcohol and gauze, intermittent ice for comfort.  Patient tolerated procedure well.      Will work to set pt up for Visual Field Testing d/t brow ptosis and visual field defect.    Sadia Partida RN  11/20/2019 5:10 PM

## 2019-11-20 NOTE — PROGRESS NOTES
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 Treatment with physical therapy has been maximized and the patient is referred for chemodenervation with botulinum toxin, The risks and benefits of the procedure were discussed.      The patient also presents with significant visual field obstruction.  She requires elevating her brows consistently to be able to see effectively.  She feels like this is significantly worse at night when she is tired and cannot make such much effort.  She has not had visual field testing but she does demonstrate manually with her hands at the corner of her eyes if she elevates her skin she can see better.  This is become an issue for reading and driving and other activities primarily towards the late end of the day evening.    Physical examination demonstrates stable right-sided facial synkinesis and hemifacial spasm.  She is ocular oral synkinesis.  As described previously.  She also has significant brow ptosis bilaterally.  The brows are below the supraorbital rim bilaterally.  When elevated manually to the appropriate anatomic position she notes significant improvement in her vision.  She also has excess upper eyelid skin which is contacting the eyelashes.      Procedure: Chemodenervation with Botulinum Toxin A  Indication: Hemifacial Spasm and Hypercontracture  Injector: Kezia Davis MD      Informed consent was obtained.  The skin was cleaned with antimicrobial solution and a topical ice was placed.     The patient was asked to systematically engage the muscles in the area to be injected. The tuberculin needles were used for subdermal injection and hemostasis was obtained with light digital pressure when needed. The skin was cleaned.     Side Treated: Right  A total of 45 units were injected.  Botulinum toxin A type: Botox    Please see procedure  log.    The patient tolerated the procedure well and there were no complications. Post procedure care instructions were given to the patient.    Assessment plan:  Botox treatment today for hemifacial spasm.  She is getting good results from it so we will continue.    We will refer the patient for visual field testing.  I think she is a good candidate for pretracheal brow lift in addition to bilateral blepharoplasty think this would improve her vision significantly.    I spent a total of 20 minutes face-to-face with Russell Rao during today's office visit not including the injection.  Over 50% of this time was spent counseling the patient and/or coordinating care regarding visual obstruction.  See note for details.

## 2019-12-06 ENCOUNTER — ALLIED HEALTH/NURSE VISIT (OUTPATIENT)
Dept: OPHTHALMOLOGY | Facility: CLINIC | Age: 69
End: 2019-12-06
Payer: COMMERCIAL

## 2019-12-06 DIAGNOSIS — H02.419 MECHANICAL PTOSIS: Primary | ICD-10-CM

## 2019-12-12 DIAGNOSIS — H53.40 VISUAL FIELD DEFECT: ICD-10-CM

## 2019-12-12 DIAGNOSIS — H02.403 ACQUIRED INVOLUTIONAL PTOSIS OF EYELID, BILATERAL: Primary | ICD-10-CM

## 2019-12-12 RX ORDER — CEFAZOLIN SODIUM 2 G/50ML
2 SOLUTION INTRAVENOUS
Status: CANCELLED | OUTPATIENT
Start: 2019-12-12

## 2019-12-12 RX ORDER — CEFAZOLIN SODIUM 1 G/50ML
1 INJECTION, SOLUTION INTRAVENOUS SEE ADMIN INSTRUCTIONS
Status: CANCELLED | OUTPATIENT
Start: 2019-12-12

## 2020-02-19 ENCOUNTER — OFFICE VISIT (OUTPATIENT)
Dept: OTOLARYNGOLOGY | Facility: CLINIC | Age: 70
End: 2020-02-19
Payer: COMMERCIAL

## 2020-02-19 VITALS — HEART RATE: 64 BPM | SYSTOLIC BLOOD PRESSURE: 137 MMHG | DIASTOLIC BLOOD PRESSURE: 74 MMHG

## 2020-02-19 DIAGNOSIS — G51.39 HEMIFACIAL SPASM: Primary | ICD-10-CM

## 2020-02-19 DIAGNOSIS — M43.6 SPASTIC TORTICOLLIS: ICD-10-CM

## 2020-02-19 DIAGNOSIS — G51.31 CLONIC HEMIFACIAL SPASM, RIGHT: ICD-10-CM

## 2020-02-19 ASSESSMENT — PATIENT HEALTH QUESTIONNAIRE - PHQ9
SUM OF ALL RESPONSES TO PHQ QUESTIONS 1-9: 0
SUM OF ALL RESPONSES TO PHQ QUESTIONS 1-9: 0
10. IF YOU CHECKED OFF ANY PROBLEMS, HOW DIFFICULT HAVE THESE PROBLEMS MADE IT FOR YOU TO DO YOUR WORK, TAKE CARE OF THINGS AT HOME, OR GET ALONG WITH OTHER PEOPLE: NOT DIFFICULT AT ALL

## 2020-02-19 ASSESSMENT — PAIN SCALES - GENERAL: PAINLEVEL: NO PAIN (0)

## 2020-02-19 NOTE — PROGRESS NOTES
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  The risks and benefits of the procedure were discussed.      Procedure: Chemodenervation with Botulinum Toxin A  Indication: Hemifacial Spasm and Hypercontracture  Injector: Kezia Davis MD      Informed consent was obtained.  The skin was cleaned with antimicrobial solution and a topical ice was placed.     The patient was asked to systematically engage the muscles in the area to be injected. The tuberculin needles were used for subdermal injection and hemostasis was obtained with light digital pressure when needed. The skin was cleaned.       A total of 45 units were injected.  Botulinum toxin A type: Botox  Please see procedure log.    The patient tolerated the procedure well and there were no complications. Post procedure care instructions were given to the patient.      Answers for HPI/ROS submitted by the patient on 2/19/2020   If you checked off any problems, how difficult have these problems made it for you to do your work, take care of things at home, or get along with other people?: Not difficult at all  PHQ9 TOTAL SCORE: 0

## 2020-02-19 NOTE — LETTER
2/19/2020       RE: Russell Rao  [address]  [facility] [address]     Dear Colleague,    Thank you for referring your patient, Russell Rao, to the [facility] EAR NOSE AND THROAT at [facility]. Please see a copy of my visit note below.    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  The risks and benefits of the procedure were discussed.      Procedure: Chemodenervation with Botulinum Toxin A  Indication: Hemifacial Spasm and Hypercontracture  Injector: Kezia Davis MD      Informed consent was obtained.  The skin was cleaned with antimicrobial solution and a topical ice was placed.     The patient was asked to systematically engage the muscles in the area to be injected. The tuberculin needles were used for subdermal injection and hemostasis was obtained with light digital pressure when needed. The skin was cleaned.       A total of 45 units were injected.  Botulinum toxin A type: Botox  Please see procedure log.    The patient tolerated the procedure well and there were no complications. Post procedure care instructions were given to the patient.      Answers for HPI/ROS submitted by the patient on 2/19/2020   If you checked off any problems, how difficult have these problems made it for you to do your work, take care of things at home, or get along with other people?: Not difficult at all  PHQ9 TOTAL SCORE: 0      Again, thank you for allowing me to participate in the care of your patient.      Sincerely,    Kezia Davis MD

## 2020-02-27 ENCOUNTER — MEDICAL CORRESPONDENCE (OUTPATIENT)
Dept: HEALTH INFORMATION MANAGEMENT | Facility: CLINIC | Age: 70
End: 2020-02-27

## 2020-02-27 ENCOUNTER — TRANSFERRED RECORDS (OUTPATIENT)
Dept: HEALTH INFORMATION MANAGEMENT | Facility: CLINIC | Age: 70
End: 2020-02-27

## 2020-03-04 ENCOUNTER — OFFICE VISIT (OUTPATIENT)
Dept: UROLOGY | Facility: CLINIC | Age: 70
End: 2020-03-04
Attending: OBSTETRICS & GYNECOLOGY
Payer: COMMERCIAL

## 2020-03-04 VITALS
HEART RATE: 76 BPM | DIASTOLIC BLOOD PRESSURE: 76 MMHG | BODY MASS INDEX: 27.18 KG/M2 | SYSTOLIC BLOOD PRESSURE: 134 MMHG | HEIGHT: 62 IN | WEIGHT: 147.7 LBS

## 2020-03-04 DIAGNOSIS — T85.9XXA COMPLICATION OF IMPLANTED VAGINAL MESH, INITIAL ENCOUNTER: Primary | ICD-10-CM

## 2020-03-04 DIAGNOSIS — N39.46 MIXED INCONTINENCE URGE AND STRESS: ICD-10-CM

## 2020-03-04 PROCEDURE — G0463 HOSPITAL OUTPT CLINIC VISIT: HCPCS | Mod: ZF

## 2020-03-04 RX ORDER — LORATADINE 10 MG/1
10 TABLET ORAL DAILY
COMMUNITY
Start: 2010-04-05 | End: 2020-03-04

## 2020-03-04 ASSESSMENT — PAIN SCALES - GENERAL: PAINLEVEL: MILD PAIN (3)

## 2020-03-04 ASSESSMENT — MIFFLIN-ST. JEOR: SCORE: 1153.34

## 2020-03-04 NOTE — NURSING NOTE
Patient tested negative for urinary stress incontinence  Voided 200 ml  bladder scan showed 20 ml residual urine,

## 2020-03-04 NOTE — PROGRESS NOTES
March 4, 2020    Referring Provider: Simran Pruitt  XXX NO INFO FOUND XXX  [address] [place] [address]    Primary Care Provider: Lelo Contreras    CC: vaginal mesh erosion    HPI:  Russell Rao is a 69 year old female who presents for evaluation of her pelvic floor symptoms.  She had an anterior and posterior prolift and TVT in 2007. 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 Pt complains of vaginal pain and burning and occ vaginal spotting. She also has recurrence of her urinary incontinence, mixed, U>S.      Prolapse:  Do you feel a vaginal bulge? no                                      Pressure? yes   Do you have to place your fingers in the vagina or in the rectum to have a bowel movement? no  Impact to quality of life? minimal     Stress Incontinence:  Do you leak urine with cough, sneeze, exercise? yes  How often do you leak with cough, sneeze, exercise?  daily  How much do you usually leak? drops   Do you wear a pad? no If so; -  Impact to quality of life? moderate    Urge Incontinence:  Do you often get sudden urges to urinate? yes  How often do have urges? daily  If so, do you leak with these urges? yes  How much do you usually leak? soak  Impact to quality of life? moderate    Voids/day:10  Nocturia: 1  Fluid intake: 5  Caffeine: 2    Urinating:  Difficulty starting urination or strain to void? no  Weak or intermittent stream? no  Incomplete emptying or dribbling? no  Pain or burning with urination? yes  Any blood in your urine? no    GI:  Constipation? yes  Frequency stools Q 3 days    Straining for stools yes  Stool consistency normal     Ever leak stool (Accidental Bowel Leakage)? yes      If so, how often?               weekly      If so, do you leak?                   liquid      Soiling without sensation? -  History of irritable bowel or Crohn's? -    Sexual/Pain:  Are you currently having sex?. no  Pain with sex?   -   Sexual Partner: -  Do any of these  symptoms interfere with sex? -  Impact to quality of life? -    Prior therapy:  Ever done pelvic floor physical therapy? no  Trial of medication? no  Have you ever tried a pessary? no    Medical History:  Do you have?   High Cholesterol? yes     Diabetes? no  High Blood pressure? no     Recurrent UTIs? yes  Sleep Apnea? no  Other medical problems: no    Surgical History:    Hysterectomy? TVH/BSO   Bladder Surgery? Anterior and posterior prolift, TVT   Other? -     OB/Gyn History:  Pregnancies? -  Deliveries? -  Vaginal -  Section -  Current birth control? -  Periods? -  When was the first day of your last period? -  Last Pap smear? - Any abnormal? -  Last mammogram? -  Last colonoscopy? -    Medications/Vitamins/Supplements: reviewed    Drug Allergies: reviewed    Latex Allergy: no  Iodine Allergy no    Family History: (list relationship and age at diagnosis)  Breast cancer? no   Ovarian cancer? no   Colon cancer? no  Other? no    Social History:  Marital status:   Do you/ have you ever smoke(d)  cigarettes? yes  Drink more than 1 alcoholic beverage a day?  no  Occupation? retired    In the past 3 months have you regularly experienced:  Chest pain w/ walking/exercise? no                   Unusual headaches? no  Leg pain w/ walking/exercise? no                       Easy bruising? no  Difficulty breathing w/ walking/exercise? no  Problems with vision? no  Dizziness, falls, or fainting? no  Excessive bleeding from cuts, gums, surgery? no  Other: no    Past Medical History:   Diagnosis Date     Allergic rhinitis      Arthritis      Bell's palsy      Migraine headaches 2009     Other and unspecified hyperlipidemia      PONV (postoperative nausea and vomiting)        Past Surgical History:   Procedure Laterality Date     BLEPHAROPLASTY BILATERAL Bilateral 2019    Procedure: Bilateral Upper Eyelid Blepharoplasty;  Surgeon: Kezia Davis MD;  Location: [facility] OR     HYSTERECTOMY, PAP NO LONGER  INDICATED      TVH and prolift repair, ovaries remain     TUBAL LIGATION         Social History     Socioeconomic History     Marital status: Single     Spouse name: Not on file     Number of children: Not on file     Years of education: Not on file     Highest education level: Not on file   Occupational History     Not on file   Social Needs     Financial resource strain: Not on file     Food insecurity:     Worry: Not on file     Inability: Not on file     Transportation needs:     Medical: Not on file     Non-medical: Not on file   Tobacco Use     Smoking status: Former Smoker     Types: Cigarettes     Last attempt to quit: 2006     Years since quittin.1     Smokeless tobacco: Never Used   Substance and Sexual Activity     Alcohol use: No     Drug use: No     Sexual activity: Not Currently     Partners: Male     Birth control/protection: None   Lifestyle     Physical activity:     Days per week: Not on file     Minutes per session: Not on file     Stress: Not on file   Relationships     Social connections:     Talks on phone: Not on file     Gets together: Not on file     Attends Latter day service: Not on file     Active member of club or organization: Not on file     Attends meetings of clubs or organizations: Not on file     Relationship status: Not on file     Intimate partner violence:     Fear of current or ex partner: Not on file     Emotionally abused: Not on file     Physically abused: Not on file     Forced sexual activity: Not on file   Other Topics Concern     Parent/sibling w/ CABG, MI or angioplasty before 65F 55M? No   Social History Narrative    Dairy/d 1-2 servings/d.     Caffeine 1 servings/d    Exercise 0 x week    Sunscreen used - Yes    Seatbelts used - Yes    Working smoke/CO detectors in the home - Yes    Guns stored in the home - No    Self Breast Exams - Yes    Self Testicular Exam - NA    Eye Exam up to date - No    Dental Exam up to date - No    Pap Smear up to date - No     "Mammogram up to date - No    PSA up to date - NA    Dexa Scan up to date - No    Flex Sig / Colonoscopy up to date - No    Immunizations up to date - Yes    Abuse: Current or Past(Physical, Sexual or Emotional)- No    Do you feel safe in your environment - Yes    MAURY Collado CMA                   Family History   Problem Relation Age of Onset     Breast Cancer Maternal Grandmother      Asthma Mother      Osteoporosis Mother      Hypertension Father      Cancer - colorectal Father      Alcohol/Drug Father      Alzheimer Disease Father         dementia     Hypertension Brother      Cerebrovascular Disease Brother              Alcohol/Drug Brother      Lipids Brother      Genitourinary Problems Sister         kidney stones     Gynecology Sister         hyst     Cerebrovascular Disease Brother        ROS    Allergies   Allergen Reactions     Anesthetics, Halogenated [Halogenated Anesthetics]      Pollen Extract      Other reaction(s): Unknown     Pollen [Pollen Extract]        Current Outpatient Medications   Medication     Ibuprofen 200 MG capsule     LORATADINE 10 MG OR TABS     simvastatin (ZOCOR) 20 MG tablet     No current facility-administered medications for this visit.        /76   Pulse 76   Ht 1.575 m (5' 2.01\")   Wt 67 kg (147 lb 11.2 oz)   BMI 27.01 kg/m   No LMP recorded. Patient has had a hysterectomy. Body mass index is 27.01 kg/m .  Ms. Rao is alert, comfortable in no acute distress, non-labored breathing.   Abdomen is soft, non-tender, non-distended, no CVAT.    Normal external female genitalia. The urethra was had 3mm caruncle, but was o/w normal appearing without masses, NT.    She has good support on supine strain.  Speculum and bimanual exam are remarkable for 1cm mesh exposure on the left anterior vaginal wall c/w her previous retro pubic sling.      3/5 kegels.    Rectal exam with normal tone, no masses or tenderness.    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

## 2020-03-04 NOTE — LETTER
3/4/2020       RE: Russell Rao  [address]  [facility] [address]     Dear Colleague,    Thank you for referring your patient, Russell Rao, to the [facility] at [facility]. Please see a copy of my visit note below.    March 4, 2020    Referring Provider: Simran Pruitt  XXX NO INFO FOUND XXX  [address] [place] [address]    Primary Care Provider: Lelo Contreras    CC: vaginal mesh erosion    HPI:  Russell Rao is a 69 year old female who presents for evaluation of her pelvic floor symptoms.  She had an anterior and posterior prolift and TVT in 2007. 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 Pt complains of vaginal pain and burning and occ vaginal spotting. She also has recurrence of her urinary incontinence, mixed, U>S.      Prolapse:  Do you feel a vaginal bulge? no                                      Pressure? yes   Do you have to place your fingers in the vagina or in the rectum to have a bowel movement? no  Impact to quality of life? minimal     Stress Incontinence:  Do you leak urine with cough, sneeze, exercise? yes  How often do you leak with cough, sneeze, exercise?  daily  How much do you usually leak? drops   Do you wear a pad? no If so; -  Impact to quality of life? moderate    Urge Incontinence:  Do you often get sudden urges to urinate? yes  How often do have urges? daily  If so, do you leak with these urges? yes  How much do you usually leak? soak  Impact to quality of life? moderate    Voids/day:10  Nocturia: 1  Fluid intake: 5  Caffeine: 2    Urinating:  Difficulty starting urination or strain to void? no  Weak or intermittent stream? no  Incomplete emptying or dribbling? no  Pain or burning with urination? yes  Any blood in your urine? no    GI:  Constipation? yes  Frequency stools Q 3 days    Straining for stools yes  Stool consistency normal     Ever leak stool  (Accidental Bowel Leakage)? yes      If so, how often?               weekly      If so, do you leak?                   liquid      Soiling without sensation? -  History of irritable bowel or Crohn's? -    Sexual/Pain:  Are you currently having sex?. no  Pain with sex?   -   Sexual Partner: -  Do any of these symptoms interfere with sex? -  Impact to quality of life? -    Prior therapy:  Ever done pelvic floor physical therapy? no  Trial of medication? no  Have you ever tried a pessary? no    Medical History:  Do you have?   High Cholesterol? yes     Diabetes? no  High Blood pressure? no     Recurrent UTIs? yes  Sleep Apnea? no  Other medical problems: no    Surgical History:    Hysterectomy? TVH/BSO   Bladder Surgery? Anterior and posterior prolift, TVT   Other? -     OB/Gyn History:  Pregnancies? -  Deliveries? -  Vaginal -  Section -  Current birth control? -  Periods? -  When was the first day of your last period? -  Last Pap smear? - Any abnormal? -  Last mammogram? -  Last colonoscopy? -    Medications/Vitamins/Supplements: reviewed    Drug Allergies: reviewed    Latex Allergy: no  Iodine Allergy no    Family History: (list relationship and age at diagnosis)  Breast cancer? no   Ovarian cancer? no   Colon cancer? no  Other? no    Social History:  Marital status:   Do you/ have you ever smoke(d)  cigarettes? yes  Drink more than 1 alcoholic beverage a day?  no  Occupation? retired    In the past 3 months have you regularly experienced:  Chest pain w/ walking/exercise? no                   Unusual headaches? no  Leg pain w/ walking/exercise? no                       Easy bruising? no  Difficulty breathing w/ walking/exercise? no  Problems with vision? no  Dizziness, falls, or fainting? no  Excessive bleeding from cuts, gums, surgery? no  Other: no    Past Medical History:   Diagnosis Date     Allergic rhinitis      Arthritis      Bell's palsy      Migraine headaches 2009     Other and  unspecified hyperlipidemia      PONV (postoperative nausea and vomiting)        Past Surgical History:   Procedure Laterality Date     BLEPHAROPLASTY BILATERAL Bilateral 2019    Procedure: Bilateral Upper Eyelid Blepharoplasty;  Surgeon: Kezia Davis MD;  Location: [facility] OR     HYSTERECTOMY, PAP NO LONGER INDICATED      TVH and prolift repair, ovaries remain     TUBAL LIGATION         Social History     Socioeconomic History     Marital status: Single     Spouse name: Not on file     Number of children: Not on file     Years of education: Not on file     Highest education level: Not on file   Occupational History     Not on file   Social Needs     Financial resource strain: Not on file     Food insecurity:     Worry: Not on file     Inability: Not on file     Transportation needs:     Medical: Not on file     Non-medical: Not on file   Tobacco Use     Smoking status: Former Smoker     Types: Cigarettes     Last attempt to quit: 2006     Years since quittin.1     Smokeless tobacco: Never Used   Substance and Sexual Activity     Alcohol use: No     Drug use: No     Sexual activity: Not Currently     Partners: Male     Birth control/protection: None   Lifestyle     Physical activity:     Days per week: Not on file     Minutes per session: Not on file     Stress: Not on file   Relationships     Social connections:     Talks on phone: Not on file     Gets together: Not on file     Attends Confucianism service: Not on file     Active member of club or organization: Not on file     Attends meetings of clubs or organizations: Not on file     Relationship status: Not on file     Intimate partner violence:     Fear of current or ex partner: Not on file     Emotionally abused: Not on file     Physically abused: Not on file     Forced sexual activity: Not on file   Other Topics Concern     Parent/sibling w/ CABG, MI or angioplasty before 65F 55M? No   Social History Narrative    Dairy/d 1-2 servings/d.      "Caffeine 1 servings/d    Exercise 0 x week    Sunscreen used - Yes    Seatbelts used - Yes    Working smoke/CO detectors in the home - Yes    Guns stored in the home - No    Self Breast Exams - Yes    Self Testicular Exam - NA    Eye Exam up to date - No    Dental Exam up to date - No    Pap Smear up to date - No    Mammogram up to date - No    PSA up to date - NA    Dexa Scan up to date - No    Flex Sig / Colonoscopy up to date - No    Immunizations up to date - Yes    Abuse: Current or Past(Physical, Sexual or Emotional)- No    Do you feel safe in your environment - Yes    MAURY Collado CMA                   Family History   Problem Relation Age of Onset     Breast Cancer Maternal Grandmother      Asthma Mother      Osteoporosis Mother      Hypertension Father      Cancer - colorectal Father      Alcohol/Drug Father      Alzheimer Disease Father         dementia     Hypertension Brother      Cerebrovascular Disease Brother              Alcohol/Drug Brother      Lipids Brother      Genitourinary Problems Sister         kidney stones     Gynecology Sister         hyst     Cerebrovascular Disease Brother        ROS    Allergies   Allergen Reactions     Anesthetics, Halogenated [Halogenated Anesthetics]      Pollen Extract      Other reaction(s): Unknown     Pollen [Pollen Extract]        Current Outpatient Medications   Medication     Ibuprofen 200 MG capsule     LORATADINE 10 MG OR TABS     simvastatin (ZOCOR) 20 MG tablet     No current facility-administered medications for this visit.        /76   Pulse 76   Ht 1.575 m (5' 2.01\")   Wt 67 kg (147 lb 11.2 oz)   BMI 27.01 kg/m    No LMP recorded. Patient has had a hysterectomy. Body mass index is 27.01 kg/m .  Ms. Rao is alert, comfortable in no acute distress, non-labored breathing.   Abdomen is soft, non-tender, non-distended, no CVAT.    Normal external female genitalia. The urethra was had 3mm caruncle, but was o/w normal appearing without masses, " NT.    She has good support on supine strain.  Speculum and bimanual exam are remarkable for 1cm mesh exposure on the left anterior vaginal wall c/w her previous retro pubic sling.      3/5 kegels.    Rectal exam with normal tone, no masses or tenderness.    neg SST  VOID 200 ml  PVR minimal mL in and out cath  Urine dip ND    A/P: Russell Rao is a 69 year old F with anterior vaginal mesh erosion    Will get UDS and schedule the patient for cystoscopy to evaluate for possible bladder involvement    A total of 45 minutes were spent with the patient today, > 50% in counseling and coordination of care    Shad Escobar MD  Professor, OB/GYN  Urogynecologist  CC  Patient Care Team:  Lelo Contreras PA-C as PCP - General  Simran Pruitt ( - Clinical)

## 2020-03-06 ENCOUNTER — PREP FOR PROCEDURE (OUTPATIENT)
Dept: OTOLARYNGOLOGY | Facility: CLINIC | Age: 70
End: 2020-03-06

## 2020-03-06 ENCOUNTER — TELEPHONE (OUTPATIENT)
Dept: OTOLARYNGOLOGY | Facility: CLINIC | Age: 70
End: 2020-03-06

## 2020-03-06 DIAGNOSIS — H02.403 ACQUIRED INVOLUTIONAL PTOSIS OF EYELID, BILATERAL: Primary | ICD-10-CM

## 2020-03-06 DIAGNOSIS — H53.40 VISUAL FIELD DEFECT: ICD-10-CM

## 2020-03-06 NOTE — TELEPHONE ENCOUNTER
Left message regarding scheduling surgery with Dr. Harman Chung. Call back number provided, [phone].           Nelsy Sosa   Perioperative Coordinator  Department of Otolaryngology    Office: [phone]

## 2020-03-10 ENCOUNTER — PRE VISIT (OUTPATIENT)
Dept: UROLOGY | Facility: CLINIC | Age: 70
End: 2020-03-10

## 2020-03-11 ENCOUNTER — PREP FOR PROCEDURE (OUTPATIENT)
Dept: OTOLARYNGOLOGY | Facility: CLINIC | Age: 70
End: 2020-03-11

## 2020-03-11 PROBLEM — H53.40 VISUAL FIELD DEFECT: Status: ACTIVE | Noted: 2020-03-11

## 2020-03-11 PROBLEM — H02.403 ACQUIRED INVOLUTIONAL PTOSIS OF EYELID, BILATERAL: Status: ACTIVE | Noted: 2020-03-11

## 2020-03-11 NOTE — TELEPHONE ENCOUNTER
Patient called back to schedule surgery with Dr. Harman Chung.     Date of Surgery: 04/13/2020    Location: [facility] OR     PAC or PCP:  PCP on file, ok per Dr. Harman Chung.     Post op: 1 week post op     Imaging: N/A     Surgery packet given: Mailed 3/11/2020    Surgery teaching completed: RAMIRO Mccullough     Sent to Prior Authorization Team: Approved per Kerline 3/6/2020    Additional comments:   N/A       Nelsy Sosa   Perioperative Coordinator   Department of Otolaryngology  P: [phone]

## 2020-03-15 ENCOUNTER — HEALTH MAINTENANCE LETTER (OUTPATIENT)
Age: 70
End: 2020-03-15

## 2020-03-17 ENCOUNTER — PRE VISIT (OUTPATIENT)
Dept: UROLOGY | Facility: CLINIC | Age: 70
End: 2020-03-17

## 2020-03-17 NOTE — TELEPHONE ENCOUNTER
Reason for Visit: Cystoscopy and review UDS    Diagnosis: urinary incontinence    Orders/Procedures/Records: in system    Contact Patient: n/a    Rooming Requirements: Joana Cazares LPN  03/17/20  2:53 PM

## 2020-03-20 ENCOUNTER — TELEPHONE (OUTPATIENT)
Dept: UROLOGY | Facility: CLINIC | Age: 70
End: 2020-03-20

## 2020-03-20 NOTE — TELEPHONE ENCOUNTER
LVM or patient to call clinic back to be rescheduled out at least a month.  Priscila Cazares LPN

## 2020-03-23 ENCOUNTER — DOCUMENTATION ONLY (OUTPATIENT)
Dept: CARE COORDINATION | Facility: CLINIC | Age: 70
End: 2020-03-23

## 2020-03-30 ENCOUNTER — TELEPHONE (OUTPATIENT)
Dept: OTOLARYNGOLOGY | Facility: CLINIC | Age: 70
End: 2020-03-30

## 2020-03-30 NOTE — TELEPHONE ENCOUNTER
Talked with patient regarding upcoming surgery with Dr. Davis on 4/13/2020 informed patient that at this time we are postponing all elective, non-emergent cases. Patient verbalized understanding. Patient has elected to reschedule surgery with the chance that she may be rescheduled again, due to COVID19 precautions. Patient was ok with this and would like to move forward. Patient was rescheduled to June 18th 2020 at [facility] OR. Will mail out new information regarding confirmation and post op appt rescheduled.       Nelsy Sosa   Perioperative Coordinator   Department of Otolaryngology    Office: [phone]

## 2020-05-04 ENCOUNTER — PRE VISIT (OUTPATIENT)
Dept: UROLOGY | Facility: CLINIC | Age: 70
End: 2020-05-04

## 2020-05-04 NOTE — TELEPHONE ENCOUNTER
Reason for Visit: Cystoscopy (will review UDS later this summer as it hasn't been scheduled yet, per Dr. Escobar)     Diagnosis: urinary incontinence     Orders/Procedures/Records: in system     Contact Patient: n/a     Rooming Requirements:: UA dip prior to getting ready for cystoscopy. If positive for Leuks and/or Nitrites, will not do cystoscopy. If positive, send urine for official UA / UC.        Priscila Cazares LPN  05/04/20  3:29 PM

## 2020-05-20 ENCOUNTER — OFFICE VISIT (OUTPATIENT)
Dept: OTOLARYNGOLOGY | Facility: CLINIC | Age: 70
End: 2020-05-20
Payer: COMMERCIAL

## 2020-05-20 VITALS — TEMPERATURE: 98.4 F | HEIGHT: 62 IN | WEIGHT: 151 LBS | BODY MASS INDEX: 27.79 KG/M2

## 2020-05-20 DIAGNOSIS — G51.39 HEMIFACIAL SPASM: Primary | ICD-10-CM

## 2020-05-20 RX ORDER — MECLIZINE HYDROCHLORIDE 25 MG/1
12.5 TABLET ORAL
COMMUNITY
Start: 2020-05-14

## 2020-05-20 ASSESSMENT — MIFFLIN-ST. JEOR: SCORE: 1168.18

## 2020-05-20 ASSESSMENT — PAIN SCALES - GENERAL: PAINLEVEL: NO PAIN (0)

## 2020-05-20 NOTE — PROGRESS NOTES
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 Treatment with physical therapy has been maximized and the patient is referred for chemodenervation with botulinum toxin, The risks and benefits of the procedure were discussed.      We also discussed her upper eyelid surgery and her brow lift surgery.   She is scheduled in June and we discussed what she needs to anticipate and what to expect in the post operative period.     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  The skin was cleaned with antimicrobial solution and a topical ice was placed.     The patient was asked to systematically engage the muscles in the area to be injected. The tuberculin needles were used for subdermal injection and hemostasis was obtained with light digital pressure when needed. The skin was cleaned.     Side Treated: right  A total of 52 units were injected.  Botulinum toxin A type: Botox    Please see procedure log.    The patient tolerated the procedure well and there were no complications. Post procedure care instructions were given to the patient.    A/P:    Botox today  Will follow up in three months for additional treatment  Surgery for visual field obstruction     I spent a total of 20 minutes face-to-face with Russell Rao during today's office visit.  Over 50% of this time was spent counseling the patient and/or coordinating care regarding her visual field obstruction and facial spasms.  See note for details.

## 2020-05-20 NOTE — LETTER
5/20/2020       RE: Russell Rao  [address]  [facility] [address]     Dear Colleague,    Thank you for referring your patient, Russell Rao, to the [facility] at [facility]. Please see a copy of my visit note below.    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 Treatment with physical therapy has been maximized and the patient is referred for chemodenervation with botulinum toxin, The risks and benefits of the procedure were discussed.      Procedure: Chemodenervation with Botulinum Toxin A  Indication: Hemifacial Spasm and Hypercontracture  Injector: Kezia Davis MD      Informed consent was obtained.  The skin was cleaned with antimicrobial solution and a topical ice was placed.     The patient was asked to systematically engage the muscles in the area to be injected. The tuberculin needles were used for subdermal injection and hemostasis was obtained with light digital pressure when needed. The skin was cleaned.     Side Treated: right  A total of 52 units were injected.  Botulinum toxin A type: Botox    Please see procedure log.    The patient tolerated the procedure well and there were no complications. Post procedure care instructions were given to the patient.      Again, thank you for allowing me to participate in the care of your patient.      Sincerely,    Kezia Davis MD

## 2020-05-20 NOTE — NURSING NOTE
"Chief Complaint   Patient presents with     RECHECK     3 juani follow up - botox     Temperature 98.4  F (36.9  C), temperature source Oral, height 1.575 m (5' 2\"), weight 68.5 kg (151 lb).    Rosetta Fung, EMT  "

## 2020-05-22 DIAGNOSIS — Z11.59 ENCOUNTER FOR SCREENING FOR OTHER VIRAL DISEASES: Primary | ICD-10-CM

## 2020-06-17 ENCOUNTER — ANESTHESIA EVENT (OUTPATIENT)
Dept: SURGERY | Facility: AMBULATORY SURGERY CENTER | Age: 70
End: 2020-06-17

## 2020-06-18 ENCOUNTER — PREP FOR PROCEDURE (OUTPATIENT)
Dept: OTOLARYNGOLOGY | Facility: CLINIC | Age: 70
End: 2020-06-18

## 2020-06-18 ENCOUNTER — HOSPITAL ENCOUNTER (OUTPATIENT)
Facility: AMBULATORY SURGERY CENTER | Age: 70
End: 2020-06-18
Attending: OTOLARYNGOLOGY
Payer: COMMERCIAL

## 2020-06-18 ENCOUNTER — ANESTHESIA (OUTPATIENT)
Dept: SURGERY | Facility: AMBULATORY SURGERY CENTER | Age: 70
End: 2020-06-18

## 2020-06-18 VITALS
HEIGHT: 61 IN | WEIGHT: 150 LBS | SYSTOLIC BLOOD PRESSURE: 134 MMHG | BODY MASS INDEX: 28.32 KG/M2 | HEART RATE: 67 BPM | DIASTOLIC BLOOD PRESSURE: 72 MMHG | RESPIRATION RATE: 14 BRPM | OXYGEN SATURATION: 95 % | TEMPERATURE: 98 F

## 2020-06-18 DIAGNOSIS — H02.403 ACQUIRED INVOLUTIONAL PTOSIS OF EYELID, BILATERAL: ICD-10-CM

## 2020-06-18 DIAGNOSIS — Z98.890 POST-OPERATIVE STATE: Primary | ICD-10-CM

## 2020-06-18 DIAGNOSIS — H53.40 VISUAL FIELD DEFECT: ICD-10-CM

## 2020-06-18 RX ORDER — OXYCODONE HYDROCHLORIDE 5 MG/1
5 TABLET ORAL EVERY 4 HOURS PRN
Status: DISCONTINUED | OUTPATIENT
Start: 2020-06-18 | End: 2020-06-19 | Stop reason: HOSPADM

## 2020-06-18 RX ORDER — MEPERIDINE HYDROCHLORIDE 25 MG/ML
12.5 INJECTION INTRAMUSCULAR; INTRAVENOUS; SUBCUTANEOUS
Status: DISCONTINUED | OUTPATIENT
Start: 2020-06-18 | End: 2020-06-19 | Stop reason: HOSPADM

## 2020-06-18 RX ORDER — BUPIVACAINE HYDROCHLORIDE 2.5 MG/ML
INJECTION, SOLUTION INFILTRATION; PERINEURAL PRN
Status: DISCONTINUED | OUTPATIENT
Start: 2020-06-18 | End: 2020-06-18 | Stop reason: HOSPADM

## 2020-06-18 RX ORDER — PROPOFOL 10 MG/ML
INJECTION, EMULSION INTRAVENOUS CONTINUOUS PRN
Status: DISCONTINUED | OUTPATIENT
Start: 2020-06-18 | End: 2020-06-18

## 2020-06-18 RX ORDER — GINSENG 100 MG
CAPSULE ORAL PRN
Status: DISCONTINUED | OUTPATIENT
Start: 2020-06-18 | End: 2020-06-18 | Stop reason: HOSPADM

## 2020-06-18 RX ORDER — ONDANSETRON 2 MG/ML
4 INJECTION INTRAMUSCULAR; INTRAVENOUS EVERY 30 MIN PRN
Status: DISCONTINUED | OUTPATIENT
Start: 2020-06-18 | End: 2020-06-19 | Stop reason: HOSPADM

## 2020-06-18 RX ORDER — EPHEDRINE SULFATE 50 MG/ML
INJECTION, SOLUTION INTRAMUSCULAR; INTRAVENOUS; SUBCUTANEOUS PRN
Status: DISCONTINUED | OUTPATIENT
Start: 2020-06-18 | End: 2020-06-18

## 2020-06-18 RX ORDER — ERYTHROMYCIN 5 MG/G
OINTMENT OPHTHALMIC PRN
Status: DISCONTINUED | OUTPATIENT
Start: 2020-06-18 | End: 2020-06-18 | Stop reason: HOSPADM

## 2020-06-18 RX ORDER — ERYTHROMYCIN 5 MG/G
0.5 OINTMENT OPHTHALMIC 2 TIMES DAILY
Qty: 3.5 G | Refills: 0 | Status: SHIPPED | OUTPATIENT
Start: 2020-06-18

## 2020-06-18 RX ORDER — FENTANYL CITRATE 50 UG/ML
INJECTION, SOLUTION INTRAMUSCULAR; INTRAVENOUS PRN
Status: DISCONTINUED | OUTPATIENT
Start: 2020-06-18 | End: 2020-06-18

## 2020-06-18 RX ORDER — PROPOFOL 10 MG/ML
INJECTION, EMULSION INTRAVENOUS PRN
Status: DISCONTINUED | OUTPATIENT
Start: 2020-06-18 | End: 2020-06-18

## 2020-06-18 RX ORDER — ACETAMINOPHEN 325 MG/1
975 TABLET ORAL ONCE
Status: DISCONTINUED | OUTPATIENT
Start: 2020-06-18 | End: 2020-06-18 | Stop reason: HOSPADM

## 2020-06-18 RX ORDER — LIDOCAINE HYDROCHLORIDE 20 MG/ML
INJECTION, SOLUTION INFILTRATION; PERINEURAL PRN
Status: DISCONTINUED | OUTPATIENT
Start: 2020-06-18 | End: 2020-06-18

## 2020-06-18 RX ORDER — SODIUM CHLORIDE, SODIUM LACTATE, POTASSIUM CHLORIDE, CALCIUM CHLORIDE 600; 310; 30; 20 MG/100ML; MG/100ML; MG/100ML; MG/100ML
INJECTION, SOLUTION INTRAVENOUS CONTINUOUS
Status: DISCONTINUED | OUTPATIENT
Start: 2020-06-18 | End: 2020-06-19 | Stop reason: HOSPADM

## 2020-06-18 RX ORDER — DEXAMETHASONE SODIUM PHOSPHATE 4 MG/ML
INJECTION, SOLUTION INTRA-ARTICULAR; INTRALESIONAL; INTRAMUSCULAR; INTRAVENOUS; SOFT TISSUE PRN
Status: DISCONTINUED | OUTPATIENT
Start: 2020-06-18 | End: 2020-06-18

## 2020-06-18 RX ORDER — SODIUM CHLORIDE, SODIUM LACTATE, POTASSIUM CHLORIDE, CALCIUM CHLORIDE 600; 310; 30; 20 MG/100ML; MG/100ML; MG/100ML; MG/100ML
INJECTION, SOLUTION INTRAVENOUS CONTINUOUS
Status: DISCONTINUED | OUTPATIENT
Start: 2020-06-18 | End: 2020-06-18 | Stop reason: HOSPADM

## 2020-06-18 RX ORDER — ONDANSETRON 2 MG/ML
INJECTION INTRAMUSCULAR; INTRAVENOUS PRN
Status: DISCONTINUED | OUTPATIENT
Start: 2020-06-18 | End: 2020-06-18

## 2020-06-18 RX ORDER — ONDANSETRON 4 MG/1
4-8 TABLET, ORALLY DISINTEGRATING ORAL EVERY 8 HOURS PRN
Qty: 4 TABLET | Refills: 0 | Status: SHIPPED | OUTPATIENT
Start: 2020-06-18

## 2020-06-18 RX ORDER — HYDROCODONE BITARTRATE AND ACETAMINOPHEN 5; 325 MG/1; MG/1
1-2 TABLET ORAL EVERY 4 HOURS PRN
Qty: 20 TABLET | Refills: 0 | Status: SHIPPED | OUTPATIENT
Start: 2020-06-18

## 2020-06-18 RX ORDER — SCOLOPAMINE TRANSDERMAL SYSTEM 1 MG/1
PATCH, EXTENDED RELEASE TRANSDERMAL PRN
Status: DISCONTINUED | OUTPATIENT
Start: 2020-06-18 | End: 2020-06-18

## 2020-06-18 RX ORDER — HYDROCODONE BITARTRATE AND ACETAMINOPHEN 5; 325 MG/1; MG/1
1-2 TABLET ORAL
Status: DISCONTINUED | OUTPATIENT
Start: 2020-06-18 | End: 2020-06-19 | Stop reason: HOSPADM

## 2020-06-18 RX ORDER — LIDOCAINE HYDROCHLORIDE AND EPINEPHRINE 10; 10 MG/ML; UG/ML
INJECTION, SOLUTION INFILTRATION; PERINEURAL PRN
Status: DISCONTINUED | OUTPATIENT
Start: 2020-06-18 | End: 2020-06-18 | Stop reason: HOSPADM

## 2020-06-18 RX ORDER — ONDANSETRON 4 MG/1
4 TABLET, ORALLY DISINTEGRATING ORAL EVERY 30 MIN PRN
Status: DISCONTINUED | OUTPATIENT
Start: 2020-06-18 | End: 2020-06-19 | Stop reason: HOSPADM

## 2020-06-18 RX ORDER — GLYCOPYRROLATE 0.2 MG/ML
INJECTION, SOLUTION INTRAMUSCULAR; INTRAVENOUS PRN
Status: DISCONTINUED | OUTPATIENT
Start: 2020-06-18 | End: 2020-06-18

## 2020-06-18 RX ORDER — NALOXONE HYDROCHLORIDE 0.4 MG/ML
.1-.4 INJECTION, SOLUTION INTRAMUSCULAR; INTRAVENOUS; SUBCUTANEOUS
Status: DISCONTINUED | OUTPATIENT
Start: 2020-06-18 | End: 2020-06-19 | Stop reason: HOSPADM

## 2020-06-18 RX ORDER — LIDOCAINE 40 MG/G
CREAM TOPICAL
Status: DISCONTINUED | OUTPATIENT
Start: 2020-06-18 | End: 2020-06-18 | Stop reason: HOSPADM

## 2020-06-18 RX ORDER — FENTANYL CITRATE 50 UG/ML
25-50 INJECTION, SOLUTION INTRAMUSCULAR; INTRAVENOUS EVERY 5 MIN PRN
Status: DISCONTINUED | OUTPATIENT
Start: 2020-06-18 | End: 2020-06-18 | Stop reason: HOSPADM

## 2020-06-18 RX ADMIN — FENTANYL CITRATE 25 MCG: 50 INJECTION, SOLUTION INTRAMUSCULAR; INTRAVENOUS at 07:32

## 2020-06-18 RX ADMIN — PROPOFOL 50 MG: 10 INJECTION, EMULSION INTRAVENOUS at 07:27

## 2020-06-18 RX ADMIN — EPHEDRINE SULFATE 5 MG: 50 INJECTION, SOLUTION INTRAMUSCULAR; INTRAVENOUS; SUBCUTANEOUS at 07:53

## 2020-06-18 RX ADMIN — GLYCOPYRROLATE 0.2 MG: 0.2 INJECTION, SOLUTION INTRAMUSCULAR; INTRAVENOUS at 07:32

## 2020-06-18 RX ADMIN — PROPOFOL 100 MG: 10 INJECTION, EMULSION INTRAVENOUS at 07:25

## 2020-06-18 RX ADMIN — PROPOFOL: 10 INJECTION, EMULSION INTRAVENOUS at 08:15

## 2020-06-18 RX ADMIN — FENTANYL CITRATE 25 MCG: 50 INJECTION, SOLUTION INTRAMUSCULAR; INTRAVENOUS at 08:12

## 2020-06-18 RX ADMIN — FENTANYL CITRATE 25 MCG: 50 INJECTION, SOLUTION INTRAMUSCULAR; INTRAVENOUS at 08:25

## 2020-06-18 RX ADMIN — PROPOFOL 150 MCG/KG/MIN: 10 INJECTION, EMULSION INTRAVENOUS at 07:25

## 2020-06-18 RX ADMIN — DEXAMETHASONE SODIUM PHOSPHATE 4 MG: 4 INJECTION, SOLUTION INTRA-ARTICULAR; INTRALESIONAL; INTRAMUSCULAR; INTRAVENOUS; SOFT TISSUE at 07:41

## 2020-06-18 RX ADMIN — FENTANYL CITRATE 25 MCG: 50 INJECTION, SOLUTION INTRAMUSCULAR; INTRAVENOUS at 07:25

## 2020-06-18 RX ADMIN — OXYCODONE HYDROCHLORIDE 5 MG: 5 TABLET ORAL at 10:16

## 2020-06-18 RX ADMIN — SCOLOPAMINE TRANSDERMAL SYSTEM 1 PATCH: 1 PATCH, EXTENDED RELEASE TRANSDERMAL at 07:10

## 2020-06-18 RX ADMIN — SODIUM CHLORIDE, SODIUM LACTATE, POTASSIUM CHLORIDE, CALCIUM CHLORIDE: 600; 310; 30; 20 INJECTION, SOLUTION INTRAVENOUS at 06:35

## 2020-06-18 RX ADMIN — PROPOFOL: 10 INJECTION, EMULSION INTRAVENOUS at 09:10

## 2020-06-18 RX ADMIN — LIDOCAINE HYDROCHLORIDE 60 MG: 20 INJECTION, SOLUTION INFILTRATION; PERINEURAL at 07:25

## 2020-06-18 RX ADMIN — PROPOFOL 50 MG: 10 INJECTION, EMULSION INTRAVENOUS at 07:39

## 2020-06-18 RX ADMIN — ONDANSETRON 4 MG: 2 INJECTION INTRAMUSCULAR; INTRAVENOUS at 09:01

## 2020-06-18 RX ADMIN — EPHEDRINE SULFATE 5 MG: 50 INJECTION, SOLUTION INTRAMUSCULAR; INTRAVENOUS; SUBCUTANEOUS at 07:34

## 2020-06-18 ASSESSMENT — MIFFLIN-ST. JEOR: SCORE: 1147.78

## 2020-06-18 NOTE — ANESTHESIA CARE TRANSFER NOTE
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    Anesthesia Type:   General     Note:  Airway :Face Mask  Patient transferred to:PACU  Comments: 141/77  98%  96.6-74-18  Handoff Report: Identifed the Patient, Identified the Reponsible Provider, Reviewed the pertinent medical history, Discussed the surgical course, Reviewed Intra-OP anesthesia mangement and issues during anesthesia, Set expectations for post-procedure period and Allowed opportunity for questions and acknowledgement of understanding      Vitals: (Last set prior to Anesthesia Care Transfer)    CRNA VITALS  6/18/2020 0926 - 6/18/2020 1002      6/18/2020             Resp Rate (observed):  (!) 1    Resp Rate (set):  10                Electronically Signed By: SEBASTIAN Johnson CRNA  June 18, 2020  10:02 AM

## 2020-06-18 NOTE — OP NOTE
SURGEON:  Kezia Davis MD   ASSISTANT SURGEON:       TITLE OF OPERATION:       Bilateral upper eyelid blepharoplasty  Bilateral pretrichial brow lift                       INDICATIONS: Russell suffers from visual field obstruction from dermatochalasis and brow ptosis. Symptoms are worse on the right side due to her facial weakness. She also has incomplete eye closure and a sluggish blink on the right side. The brow is dropped on the right beyond the left as she cannot elevate since the frontalis does not work. She failed pre-operative visual field testing.      PREOPERATIVE DIAGNOSES:   Visual field defect  Bilateral dermatochalasis  Bilateral Brow ptosis  Right facial paralysis      POSTOPERATIVE DIAGNOSES:     Visual field defect  Bilateral dermatochalasis  Bilateral Brow ptosis  Right facial paralysis      ANESTHESIA:    General with LMA      IMPLANT DEVICES: None        SPECIMENS:  None.       COMPLICATIONS:  None.       BLOOD LOSS: 15 mls          SURGEON'S NARRATIVE:       FINDINGS:  The patient had 7 mm to there crease on the right and 8 mm to the crease from lid margin on the left. With skin excision, 20 mm were preserved on the left of upper lid and 21 on the right which is the paretic side.     Bilateral temporal lifts were performed bluntly just above the deep temporal fascia.         DESCRIPTION OF PROCEDURE:      The patient was brought back to the operating room by the Anesthesiology staff. They were laid supine on the operating room table and induced into general anesthesia with the LMA secured at the midline of the chin.  The head of the bed was then turned 90 degrees towards the surgical team.  Arms were tucked at the side with all pressure points appropriately padded.  A time-out procedure was performed with all members of the operating room staff in agreement of the site of surgery, the patient identification and surgery to be performed.     We started with measuring the lid anatomic  landmarks and performing measurements for skin excision. Once this was done, 1% lidocaine with 1:100, 000 epinephrine, a total of 12 mls was injected for upper eyelid analgesic and brow with supraorbital blocks.   The forehead incision was also designed in a geometric broken line.     The patient's face was prepped and draped in sterile fashion with ophthalmic diluted betadine. We started with the upper eyelid surgery. The incisions were cut with a 15 blade scalpel and a skin only blepharoplast was performed bilaterally with high temp cautery. The skin incision was closed with 6.0 fast absorbing plain gut suture bilaterally.    We covered the incisions with erythromycin ophthalmic ointment and telfa and tegaderm and proceeded with the browlift.     A geometric broken line incision was performed at the scalp and the flap was elevated medially in the superiosteal plane and temporally just above the deep temporal fascia. Temporally dissection was done bluntly with scissors to limit any possible trauma to the frontal branch of the facial nerve. The periosteum was lifted off of the facial skeleton and the arcus marginalis was released with the periosteal elevator. The supraorbital neurovascular bundles were preserved bilaterally. Once the flap was elevated, 3-0 PDS was used to tack the periosteal flaps in place.     Deep stitches were then placed with 4-0 monocryl suture and the excess skin was excised. The skin incision was closed with running and interrupted 5-0 nylon and temporally with running 5-0 fast absorbing gut.     The hair was then washed. Erythromycin ointment was placed on the eyelid incisions and ointment was placed on the skin incision. Bupivacaine was injected into the forehead for prolonged analgesia.  A forehead compressing dressing was then placed with fluff and kerlix. Coban was then placed over it.         The patient tolerated the procedure well.  There were no complications. The patient was  extubated and taken to recovery following commands and breathing spontaneously.       I was present and scrubbed for the entire procedure.           STEPHENIE PARK MD

## 2020-06-18 NOTE — ANESTHESIA POSTPROCEDURE EVALUATION
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     Anesthesia Type: General       Disposition: Outpatient   Postop Pain Control: Uneventful            Sign Out: Well controlled pain   PONV: No   Neuro/Psych: Uneventful            Sign Out: Acceptable/Baseline neuro status   Airway/Respiratory: Uneventful            Sign Out: Acceptable/Baseline resp. status   CV/Hemodynamics: Uneventful            Sign Out: Acceptable CV status   Other NRE: NONE   DID A NON-ROUTINE EVENT OCCUR? No         Last Anesthesia Record Vitals:  CRNA VITALS  2020 0926 - 2020 1026      2020             Resp Rate (observed):  (!) 1    Resp Rate (set):  10          Last PACU Vitals:  Vitals Value Taken Time   /72 2020 10:30 AM   Temp 36.7  C (98  F) 2020 10:15 AM   Pulse 70 2020 10:30 AM   Resp 13 2020 10:33 AM   SpO2 98 % 2020 10:33 AM   Temp src     NIBP     Pulse     SpO2     Resp     Temp     Ht Rate     Temp 2     Vitals shown include unvalidated device data.      Electronically Signed By: Camelia Bedoya MD, 2020, 2:33 PM

## 2020-06-18 NOTE — ANESTHESIA PREPROCEDURE EVALUATION
"Anesthesia Pre-Procedure Evaluation    Patient: Russell Rao   MRN:     [id number] Gender:   female   Age:    69 year old :      1950        Preoperative Diagnosis: Acquired involutional ptosis of eyelid, bilateral [H02.403]  Visual field defect [H53.40]   Procedure(s):  Bilateral upper eyelid blepharoplasty, bilateral pretrichial brow lift     LABS:  CBC:   Lab Results   Component Value Date    WBC 6.6 2006    HGB 10.2 (L) 2007    HGB 12.9 2007    HCT 37.5 2006     2006     BMP:   Lab Results   Component Value Date     2006    POTASSIUM 3.5 2006    CHLORIDE 111 (H) 2006    CO2 23 2006    BUN 13 2006    CR 0.56 (L) 2006    GLC 98 2010     2006     COAGS: No results found for: PTT, INR, FIBR  POC: No results found for: BGM, HCG, HCGS  OTHER:   Lab Results   Component Value Date    KLEVER 9.1 2006    ALBUMIN 3.9 2006    PROTTOTAL 7.5 2006    ALT 17 10/11/2011    AST 20 2006    ALKPHOS 78 2006    BILITOTAL 0.3 2006    TSH 0.81 2010        Preop Vitals    BP Readings from Last 3 Encounters:   20 134/76   20 137/74   04/10/19 135/70    Pulse Readings from Last 3 Encounters:   20 76   20 64   19 67      Resp Readings from Last 3 Encounters:   19 16   12 12   12 12    SpO2 Readings from Last 3 Encounters:   19 95%      Temp Readings from Last 1 Encounters:   20 36.9  C (98.4  F) (Oral)    Ht Readings from Last 1 Encounters:   20 1.575 m (5' 2\")      Wt Readings from Last 1 Encounters:   20 68.5 kg (151 lb)    Estimated body mass index is 27.62 kg/m  as calculated from the following:    Height as of 20: 1.575 m (5' 2\").    Weight as of 20: 68.5 kg (151 lb).     LDA:        Past Medical History:   Diagnosis Date     Allergic rhinitis      Arthritis      Bell's palsy      Migraine headaches " 1/2/2009     Other and unspecified hyperlipidemia      PONV (postoperative nausea and vomiting)       Past Surgical History:   Procedure Laterality Date     BLEPHAROPLASTY BILATERAL Bilateral 2/21/2019    Procedure: Bilateral Upper Eyelid Blepharoplasty;  Surgeon: Kezia Davis MD;  Location: [facility] OR     HYSTERECTOMY, PAP NO LONGER INDICATED      TVH and prolift repair, ovaries remain     TUBAL LIGATION        Allergies   Allergen Reactions     Anesthetics, Halogenated [Halogenated Anesthetics]      Pollen Extract      Other reaction(s): Unknown     Pollen [Pollen Extract]         Anesthesia Evaluation     . Pt has had prior anesthetic. Type: General and MAC    History of anesthetic complications   - PONV        ROS/MED HX    ENT/Pulmonary:     (+)allergic rhinitis, , . .    Neurologic: Comment: Bell's Palsy    (+)migraines,     Cardiovascular:         METS/Exercise Tolerance:     Hematologic:         Musculoskeletal:         GI/Hepatic:         Renal/Genitourinary:         Endo:         Psychiatric:         Infectious Disease:         Malignancy:         Other: Comment: Allergy to halogenated anesthetics                    [facility] AN PHYSICAL EXAM    Assessment:   ASA SCORE: 2            Plan:   Anes. Type:  General   Pre-Medication: None   Induction:  IV (Standard)   Airway: LMA   Access/Monitoring: PIV   Maintenance: Propofol Sedation     Postop Plan:   Postop Pain: Opioids  Postop Sedation/Airway: Not planned  Disposition: Outpatient     PONV Management:   Adult Risk Factors: Female, H/o PONV or Motion Sickness, Postop Opioids   Prevention: Ondansetron, Dexamethasone, Scopolamine, Propofol                   Camelia Bedoya MD

## 2020-06-18 NOTE — DISCHARGE INSTRUCTIONS
[facility]  Home Care Following Anesthesia  For 24 hours after surgery:  1. Get plenty of rest.  A responsible adult must stay with you for at least 24 hours after you leave the surgery center.  2. Do not drive or use heavy equipment.  If you have weakness or tingling, don't drive or use heavy equipment until this feeling goes away.   3. Do not drink alcohol.   4. Avoid strenuous or risky activities.  Ask for help when climbing stairs.  5. You may feel lightheaded.  IF so, sit for a few minutes before standing.  Have someone help you get up.   6. If you have nausea (feel sick to your stomach): Drink only clear liquids such as apple juice, ginger ale, broth or 7-Up.  Rest may also help.  Be sure to drink enough fluids.  Move to a regular diet as you feel able.   7. You may have a slight fever.  Call the doctor if your fever is over 100 F (37.7 C) (taken under the tongue) or lasts longer than 24 hours.  8. You may have a dry mouth, a sore throat, muscle aches or trouble sleeping. These should go away after 24 hours.  9. Do not make important or legal decisions.               Tips for taking pain medications  To get the best pain relief possible, remember these points:    Take pain medications as directed, before pain becomes severe.    Pain medication can upset your stomach: taking it with food may help.    Constipation is a common side effect of pain medication. Drink plenty of  fluids.    Eat foods high in fiber. Take a stool softener if recommended by your doctor or pharmacist.    Do not drink alcohol, drive or operate machinery while taking pain medications.    Ask about other ways to control pain, such as with heat, ice or relaxation.    Tylenol/Acetaminophen Consumption  To help encourage the safe use of acetaminophen, the makers of TYLENOL  have lowered the maximum daily dose for single-ingredient Extra Strength TYLENOL  (acetaminophen) products sold in the [place] from 8  pills per day (4,000 mg) to 6 pills per day (3,000 mg). The dosing interval has also changed from 2 pills every 4-6 hours to 2 pills every 6 hours.    If you feel your pain relief is insufficient, you may take Tylenol/Acetaminophen in addition to your narcotic pain medication.     Be careful not to exceed 3,000 mg of Tylenol/Acetaminophen in a 24 hour period from all sources.    If you are taking extra strength Tylenol/acetaminophen (500 mg), the maximum dose is 6 tablets in 24 hours.    If you are taking regular strength acetaminophen (325 mg), the maximum dose is 9 tablets in 24 hours.    Call a doctor for any of the followin. Signs of infection (fever, growing tenderness at the surgery site, a large amount of drainage or bleeding, severe pain, foul-smelling drainage, redness, swelling).  2. It has been over 8 to 10 hours since surgery and you are still not able to urinate (pass water).  3. Headache for over 24 hours.  4. Numbness, tingling or weakness the day after surgery (if you had spinal anesthesia).  5. Signs of Covid-19 infection (temperature over 100 degrees, shortness of breath, cough, loss of taste/smell, generalized body aches, persistent headache, chills, sore throat, nausea/vomiting/diarrhea)  Your doctor is:  Dr. Kezia Davis, Otolaryngology: [phone]                    Or dial [phone] and ask for the resident on call for:  ENT Otolaryngology  For emergency care, call the:  [facility]:  [phone] (TTY for hearing impaired: [phone])    Scopolamine Patch- (Absorbed through the skin)    This medicine prevents nausea and vomiting caused by motion sickness or anesthesia.  The medicine is in a patch worn behind the ear.      Do NOT use the Scopolamine Patch if you have glaucoma or are allergic to scopolamine.    How to Use This Medicine:    The patch is applied behind the ear.    Keep the patch dry to prevent it from falling off.  Limit contact with water (no  bathing or swimming).      If the patch is loose or falls off throw it away.  You do not need to apply a new patch.    After you take off the patch or if it falls off, wash your hands and the area behind your ear with soap and water.      You can remove the patch tomorrow, or leave on for up to 3 days.    Only one patch should be used at any time.    How to Dispose of This Medicine:    Fold the used patch in half with the sticky sides together. Throw any used patch away so that children or pets cannot get to it. You will also need to throw away old patches after the expiration date has passed.    Keep all medicine away from children and never share your medicine with anyone.    Warnings While Using This Medicine:    This medicine can make you sleepy.  Avoid taking sleeping pills and other medicines that can make you sleepy while the patch is on.    Do not drink alcohol while the patch is on.    This medicine can cause temporary blurring and other vision problems if it comes in contact with the eyes.  This is not serious unless accompanied by eye pain and redness.     This medicine may cause problems with urination. If you have problems with urinating, remove the patch.  If you are unable to urinate, call your doctor.      This medicine may make you dizzy or drowsy. Avoid driving, using machines, or doing anything else that could be dangerous if the patch is on.    This medicine may make you sweat less and cause your body to get too hot. Be careful in hot weather or if you are exercising.    Make sure any doctor or dentist who treats you knows that you have the patch on. This medicine may affect the results of certain medical tests.    Skin burns have been reported at the patch site in several patients wearing an aluminized transdermal system during a magnetic resonance imaging scan (MRI).  Since this patch contains aluminum, it is recommended to remove the patch if you are having an MRI.    Possible Side Effects  While Using This Medicine:    Dry mouth    Drowsiness    Temporary blurring of vision and widening of the pupils    Call your doctor right away if you notice any of these side effects:    Allergic reaction: Itching or hives, swelling in your face or hands, swelling or tingling in your mouth or throat, chest tightness, trouble breathing.    Blurred vision that does not go away after the patch is removed    Confusion or memory loss    Fast,slow, or uneven heartbeat    Lightheadedness, dizziness, drowsiness, or fainting    Seeing, hearing, or feeling things that are not there    Restlessness    Severe eye pain    Trouble urinating    If you notice other side effects that you think are caused by this medicine, call your doctor immediately.

## 2020-06-24 ENCOUNTER — OFFICE VISIT (OUTPATIENT)
Dept: OTOLARYNGOLOGY | Facility: CLINIC | Age: 70
End: 2020-06-24
Payer: COMMERCIAL

## 2020-06-24 VITALS
HEART RATE: 67 BPM | OXYGEN SATURATION: 95 % | WEIGHT: 152 LBS | TEMPERATURE: 98.5 F | BODY MASS INDEX: 27.97 KG/M2 | HEIGHT: 62 IN

## 2020-06-24 DIAGNOSIS — Z98.890 POSTOPERATIVE STATE: Primary | ICD-10-CM

## 2020-06-24 ASSESSMENT — MIFFLIN-ST. JEOR: SCORE: 1168.75

## 2020-06-24 ASSESSMENT — PAIN SCALES - GENERAL: PAINLEVEL: SEVERE PAIN (6)

## 2020-06-24 NOTE — PROGRESS NOTES
Facial Plastic and Reconstructive Surgery      Russell Rao presents for post op follow up from pretrichial brow lift and bilateral blepharoplasty.  She is doing well and has minimal pain.    On exam she has expected echymosis and swelling.  Her incisions are healing well and stitches were removed with no concerns.       I will see her back in one month

## 2020-06-24 NOTE — LETTER
6/24/2020       RE: Russell Rao  [address]  [facility] [address]     Dear Colleague,    Thank you for referring your patient, Russell Rao, to the [facility] at [facility]. Please see a copy of my visit note below.    Facial Plastic and Reconstructive Surgery      Russell Rao presents for post op follow up from pretrichial brow lift and bilateral blepharoplasty.  She is doing well and has minimal pain.    On exam she has expected echymosis and swelling.  Her incisions are healing well and stitches were removed with no concerns.       I will see her back in one month    Again, thank you for allowing me to participate in the care of your patient.      Sincerely,    Kezia Davis MD

## 2020-06-24 NOTE — NURSING NOTE
"Chief Complaint   Patient presents with     RECHECK     1 week post bilater upper eyelid blepharoplasty     Pulse 67, temperature 98.5  F (36.9  C), temperature source Temporal, height 1.568 m (5' 1.75\"), weight 68.9 kg (152 lb), SpO2 95 %.    Rosetta Fung, EMT  "

## 2020-07-22 ENCOUNTER — ALLIED HEALTH/NURSE VISIT (OUTPATIENT)
Dept: OTOLARYNGOLOGY | Facility: CLINIC | Age: 70
End: 2020-07-22
Payer: COMMERCIAL

## 2020-07-22 DIAGNOSIS — Z98.890 POSTOPERATIVE STATE: Primary | ICD-10-CM

## 2020-07-22 NOTE — PROGRESS NOTES
Pt. Comes in PO 6/18/20 s/p Bilateral Browlift and Bilateral Upper Blepharoplasty.    Incision are well healed and approximated.  All bruising and edema has resolved.    R. Eyelid absorbable sutures are not absorbing and are bothering the patient.  Trimmed two sutures in the right eyelid incision.    Pt to follow up next month for Botox with Dr. Davis.    Sadia Partida RN  7/22/2020 10:06 AM

## 2020-07-27 ENCOUNTER — PRE VISIT (OUTPATIENT)
Dept: UROLOGY | Facility: CLINIC | Age: 70
End: 2020-07-27

## 2020-07-27 NOTE — TELEPHONE ENCOUNTER
Visit Type : UDS    Hx/Sx: Mixed incontinence    Records/Orders: Yes    Pt Contacted: letter 07/27/20    At Rooming: Plastic for leaking

## 2020-08-09 NOTE — PROGRESS NOTES
PREPROCEDURE DIAGNOSES:    1. 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 Vaginal pain and burning  3. Mixed urinary incontinence    POSTPROCEDURE DIAGNOSES:  -Normal bladder capacity (601 mL) with somewhat delayed filling sensations.  -Good bladder compliance without DO/DOI.  -No reproducible SOLEDAD.  -Weak detrusor contraction during voiding to max Pdet 12 cm H2O.  -Slow flow rate (Qmax 14 mL/s) with a flat, fluctuating flow curve and incomplete bladder emptying (final  mL).  -EMG quiet during voiding.  -Fluoroscopy reveals a smooth, somewhat lobulated bladder wall no diverticulae or VUR. The bladder neck was closed during filling and open during voiding.     PROCEDURE:    1. Sterile urethral catheterization for measurement of postvoid residual urine volume.  2. Complex filling cystometrogram with measurement of bladder and rectal pressures.  3. Complex voiding cystometrogram with measurement of bladder and rectal pressures.  4. Electromyography of the pelvic floor during urodynamics.  5. Fluoroscopic imaging of the bladder during urodynamics, at least 3 views.    6. Interpretation of urodynamics and flouroscopic imaging.      INDICATIONS FOR PROCEDURE:  Ms. 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 Baseline video urodynamic assessment is requested today by Dr. Escobar to better characterize Ms. Russell Rao's voiding dysfunction.      VOIDING DIARY:  Not completed.    DESCRIPTION OF PROCEDURE:  Risks, benefits, and alternatives to urodynamics were discussed with the patient and she wished to proceed.  Urodynamics are planned to better assess the primary etiology for Ms. Rao's urologic dysfunction.  The patient does not take anticholinergic  medication.  After informed consent was obtained, the patient was taken to the procedure room where th study was initiated. Findings below.     PRE-STUDY UROFLOWMETRY:  Postvoid residual by catheter: 176 mL.  Pretest urine dipstick was negative for leukocytes and nitrites.    Next a 7F double-lumen urodynamics catheter was inserted into the bladder under sterile technique via the urethra.  A 7F abdominal manometry catheter was placed in the rectum.  EMG pads were placed on both sides of the anal verge.  The bladder was filled with 200 mL of Omnipaque at 30 mL/minute and serial pressures were recorded.  With coughing there was an appropriate rise in vesical and abdominal pressures with no change in detrusor pressure, confirming good study catheter placement.    DURING THE FILLING PHASE:  First sensation: 189 mL.  First Desire: 441 mL.  Strong Desire: 442 mL.  Maximum Capacity: 601 mL.    Uninhibited detrusor contractions: None.  Compliance: Good. PDet=0 cmH20 at capacity.  Continence: No reproducible DOI or SOLEDAD.  EMG: Concordant during filling.    DURING THE VOIDING PHASE:  Maximum detrusor contraction with void: 12 cm of H2O pressure.  Voided volume: 431 mL.  Maximum flow rate: 14 mL/sec.  Average flow rate: 6.3 mL/sec.  Postvoid Residual: 169 mL.  EMG activity: Quiet.  Character of voiding curve: Flat, fluctuating.  BOOI: -27.4 (suggesting no obstruction - see key below)  [obstructed (JACOB index [BOOI] ? 40); equivocal (no definite   obstruction; BOOI 20-40); and no obstruction (BOOI ? 20)]    FLUOROSCOPIC IMAGING OF THE BLADDER DURING URODYNAMICS:  Please note, image numbers on UDS tracings correlate with iSite series numbers on PACS images. Fluoroscopy during today's procedure demonstrated a smooth, somewhat lobulated bladder wall no diverticulae or cellules.  No vesicoureteral reflux was observed.  The bladder neck was closed during filling and open during voiding.  At the completion of the study, all catheters  were removed and the patient was brought back into the consultation room to further discuss today's study results.      ASSESSMENT/PLAN:  Ms. 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  -Good bladder compliance without DO/DOI.  -No reproducible SOLEDAD.  -Weak detrusor contraction during voiding to max Pdet 12 cm H2O.  -Slow flow rate (Qmax 14 mL/s) with a flat, fluctuating flow curve and incomplete bladder emptying (final  mL).  -EMG quiet during voiding.  -Fluoroscopy reveals a smooth, somewhat lobulated bladder wall no diverticulae or VUR. The bladder neck was closed during filling and open during voiding.     The patient will follow up as scheduled with Dr. Escobar to further discuss today's study results and make plans for how best to proceed.      - A single Bactrim DS was provided for UTI prophylaxis following completion of today's study per department protocol.  The risk of UTI with VUDS is low at ~2.5-3%.      Thank you for allowing me to participate in the care of Ms. Russell Rao and please don't hesitate to contact me with any questions or concerns.      This procedure was performed under a collaborative agreement with Dr. Joni Caputo, Professor and  of Urology, [facility].    SEBASTIAN Cagle, CNP  Department of Urology

## 2020-08-10 ENCOUNTER — OFFICE VISIT (OUTPATIENT)
Dept: UROLOGY | Facility: CLINIC | Age: 70
End: 2020-08-10
Payer: COMMERCIAL

## 2020-08-10 ENCOUNTER — ANCILLARY PROCEDURE (OUTPATIENT)
Dept: RADIOLOGY | Facility: AMBULATORY SURGERY CENTER | Age: 70
End: 2020-08-10
Attending: NURSE PRACTITIONER
Payer: COMMERCIAL

## 2020-08-10 VITALS — WEIGHT: 150 LBS | HEIGHT: 62 IN | BODY MASS INDEX: 27.6 KG/M2

## 2020-08-10 DIAGNOSIS — N39.46 MIXED INCONTINENCE URGE AND STRESS: ICD-10-CM

## 2020-08-10 DIAGNOSIS — R32 URINARY INCONTINENCE, UNSPECIFIED TYPE: ICD-10-CM

## 2020-08-10 DIAGNOSIS — T85.9XXA COMPLICATION OF IMPLANTED VAGINAL MESH, INITIAL ENCOUNTER: Primary | ICD-10-CM

## 2020-08-10 RX ORDER — SULFAMETHOXAZOLE/TRIMETHOPRIM 800-160 MG
1 TABLET ORAL ONCE
Status: COMPLETED | OUTPATIENT
Start: 2020-08-10 | End: 2020-08-10

## 2020-08-10 RX ADMIN — SULFAMETHOXAZOLE AND TRIMETHOPRIM 1 TABLET: 800; 160 TABLET ORAL at 08:21

## 2020-08-10 ASSESSMENT — PAIN SCALES - GENERAL: PAINLEVEL: NO PAIN (0)

## 2020-08-10 ASSESSMENT — MIFFLIN-ST. JEOR: SCORE: 1163.65

## 2020-08-10 NOTE — LETTER
8/10/2020       RE: Russell Rao  [address]  [facility] [address]     Dear Colleague,    Thank you for referring your patient, Russell Rao, to the [facility] UROLOG[facility] AND INST FOR PROSTATE AND UROLOGIC CANCERS at [facility]. Please see a copy of my visit note below.    PREPROCEDURE DIAGNOSES:    1. 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 Vaginal pain and burning  3. Mixed urinary incontinence    POSTPROCEDURE DIAGNOSES:  -Normal bladder capacity (601 mL) with somewhat delayed filling sensations.  -Good bladder compliance without DO/DOI.  -No reproducible SOLEDAD.  -Weak detrusor contraction during voiding to max Pdet 12 cm H2O.  -Slow flow rate (Qmax 14 mL/s) with a flat, fluctuating flow curve and incomplete bladder emptying (final  mL).  -EMG quiet during voiding.  -Fluoroscopy reveals a smooth, somewhat lobulated bladder wall no diverticulae or VUR. The bladder neck was closed during filling and open during voiding.     PROCEDURE:    1. Sterile urethral catheterization for measurement of postvoid residual urine volume.  2. Complex filling cystometrogram with measurement of bladder and rectal pressures.  3. Complex voiding cystometrogram with measurement of bladder and rectal pressures.  4. Electromyography of the pelvic floor during urodynamics.  5. Fluoroscopic imaging of the bladder during urodynamics, at least 3 views.    6. Interpretation of urodynamics and flouroscopic imaging.      INDICATIONS FOR PROCEDURE:  Ms. 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 Baseline video urodynamic assessment is requested today by Dr. Escobar to better characterize Ms. Russell Rao's voiding  dysfunction.      VOIDING DIARY:  Not completed.    DESCRIPTION OF PROCEDURE:  Risks, benefits, and alternatives to urodynamics were discussed with the patient and she wished to proceed.  Urodynamics are planned to better assess the primary etiology for Ms. Rao's urologic dysfunction.  The patient does not take anticholinergic medication.  After informed consent was obtained, the patient was taken to the procedure room where th study was initiated. Findings below.     PRE-STUDY UROFLOWMETRY:  Postvoid residual by catheter: 176 mL.  Pretest urine dipstick was negative for leukocytes and nitrites.    Next a 7F double-lumen urodynamics catheter was inserted into the bladder under sterile technique via the urethra.  A 7F abdominal manometry catheter was placed in the rectum.  EMG pads were placed on both sides of the anal verge.  The bladder was filled with 200 mL of Omnipaque at 30 mL/minute and serial pressures were recorded.  With coughing there was an appropriate rise in vesical and abdominal pressures with no change in detrusor pressure, confirming good study catheter placement.    DURING THE FILLING PHASE:  First sensation: 189 mL.  First Desire: 441 mL.  Strong Desire: 442 mL.  Maximum Capacity: 601 mL.    Uninhibited detrusor contractions: None.  Compliance: Good. PDet=0 cmH20 at capacity.  Continence: No reproducible DOI or SOLEDAD.  EMG: Concordant during filling.    DURING THE VOIDING PHASE:  Maximum detrusor contraction with void: 12 cm of H2O pressure.  Voided volume: 431 mL.  Maximum flow rate: 14 mL/sec.  Average flow rate: 6.3 mL/sec.  Postvoid Residual: 169 mL.  EMG activity: Quiet.  Character of voiding curve: Flat, fluctuating.  BOOI: -27.4 (suggesting no obstruction - see key below)  [obstructed (JACOB index [BOOI] ? 40); equivocal (no definite   obstruction; BOOI 20-40); and no obstruction (BOOI ? 20)]    FLUOROSCOPIC IMAGING OF THE BLADDER DURING URODYNAMICS:  Please note, image numbers on UDS  tracings correlate with iSite series numbers on PACS images. Fluoroscopy during today's procedure demonstrated a smooth, somewhat lobulated bladder wall no diverticulae or cellules.  No vesicoureteral reflux was observed.  The bladder neck was closed during filling and open during voiding.  At the completion of the study, all catheters were removed and the patient was brought back into the consultation room to further discuss today's study results.      ASSESSMENT/PLAN:  Ms. 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  -Good bladder compliance without DO/DOI.  -No reproducible SOLEDAD.  -Weak detrusor contraction during voiding to max Pdet 12 cm H2O.  -Slow flow rate (Qmax 14 mL/s) with a flat, fluctuating flow curve and incomplete bladder emptying (final  mL).  -EMG quiet during voiding.  -Fluoroscopy reveals a smooth, somewhat lobulated bladder wall no diverticulae or VUR. The bladder neck was closed during filling and open during voiding.     The patient will follow up as scheduled with Dr. Escobar to further discuss today's study results and make plans for how best to proceed.      - A single Bactrim DS was provided for UTI prophylaxis following completion of today's study per department protocol.  The risk of UTI with VUDS is low at ~2.5-3%.      Thank you for allowing me to participate in the care of Ms. Russell Rao and please don't hesitate to contact me with any questions or concerns.      This procedure was performed under a collaborative agreement with Dr. Joni Caputo, Professor and  of Urology, [facility].    SEBASTIAN Cagle,  CNP  Department of Urology

## 2020-08-10 NOTE — NURSING NOTE
"UDS-  She complains most of mixed incontinence, she wears 0 pads or depends because her sx have been very minimal lately.       Chief Complaint   Patient presents with     Urodynamics Study     Mixed incontinence        Height 1.575 m (5' 2\"), weight 68 kg (150 lb). Body mass index is 27.44 kg/m .    Patient Active Problem List   Diagnosis     Constipation     Migraine headache     Allergic state     S/P hysterectomy     Hyperlipidemia LDL goal <130     Advanced directives, counseling/discussion     Migraine     Obesity     Bell's palsy     Epigastric pain     Eyelid cancer     Major depression, single episode     Symptomatic cholelithiasis     Oral phase dysphagia     Dysarthria     Hemifacial spasm     Visual field constriction, bilateral     Acquired involutional ptosis of eyelid, bilateral     Visual field defect       Allergies   Allergen Reactions     Anesthetics, Halogenated [Halogenated Anesthetics] Nausea and Vomiting     Pollen Extract Itching     Other reaction(s): Unknown     Pollen [Pollen Extract] Itching       Current Outpatient Medications   Medication Sig Dispense Refill     erythromycin (ROMYCIN) 5 MG/GM ophthalmic ointment Place 0.5 inches into both eyes 2 times daily Apply to both upper eyelid incision with a qtip 3.5 g 0     HYDROcodone-acetaminophen (NORCO) 5-325 MG tablet Take 1-2 tablets by mouth every 4 hours as needed for moderate to severe pain 20 tablet 0     Ibuprofen 200 MG capsule Take 200 mg by mouth every 4 hours as needed.       LORATADINE 10 MG OR TABS ONE DAILY 90 Tab 3     meclizine (ANTIVERT) 25 MG tablet Take 12.5 mg by mouth       ondansetron (ZOFRAN-ODT) 4 MG ODT tab Take 1-2 tablets (4-8 mg) by mouth every 8 hours as needed for nausea 4 tablet 0     simvastatin (ZOCOR) 20 MG tablet Take 1 tablet by mouth At Bedtime. 90 tablet 3       Social History     Tobacco Use     Smoking status: Former Smoker     Types: Cigarettes     Last attempt to quit: 1/21/2006     Years since " quittin.5     Smokeless tobacco: Never Used   Substance Use Topics     Alcohol use: No     Drug use: No       Invasive Procedure Safety Checklist:    Procedure: Urodynamics    Action: Complete sections and checkboxes as appropriate.  Pre-procedure:  1. Patient ID Verified with 2 identifiers (Kelley and  or MRN) : YES    2. Procedure and site verified with patient/designee (when able) : YES    3. Accurate consent documentation in medical record : YES    4. H&P (or appropriate assessment) documented in medical record : N/A  H&P must be up to 30 days prior to procedure an updated within 24 hours of Procedure as applicable.     5. Relevant diagnostic and radiology test results appropriately labeled and displayed as applicable : YES    6. Blood products, implants, devices, and/or special equipment available for the procedure as applicable : YES    7. Procedure site(s) marked with provider initials [Exclusions: none] : NO    8. Marking not required. Reason : Yes  Procedure does not require site marking    Time Out:     Time-Out performed immediately prior to starting procedure, including verbal and active participation of all team members addressing: YES    1. Correct patient identity.  2. Confirmed that the correct side and site are marked.  3. An accurate procedure to be done.  4. Agreement on the procedure to be done.  5. Correct patient position.  6. Relevant images and results are properly labeled and appropriately displayed.  7. The need to administer antibiotics or fluids for irrigation purposes during the procedure as applicable.  8. Safety precautions based on patient history or medication use.    During Procedure: Verification of correct person, site, and procedure occurs any time the responsibility for care of the patient is transferred to another member of the care team.    The following medication was given: Sulfamethoxazole / Trimethoprim    MEDICATION:  Bactrim  ROUTE: PO  SITE: Orally  DOSE:  800/160mg  LOT #: [id number]  : [employer]  EXPIRATION DATE: 11/2020  NDC#: [address]-23   Was there drug waste? No    Prior to administration, verified patient identity using patient's name and date of birth.  Due to administration, patient instructed to remain in clinic for 15 minutes  afterwards, and to report any adverse reaction to me immediately.    Drug Amount Wasted:  None.  Vial/Syringe: Single dose vial      The following medication was given:     MEDICATION:  Omnipaque (Iohexol Injection) (240mgI/mL)  ROUTE: Provider Administered  SITE: Provider Administered via catheter  DOSE: 200mL  LOT #: [id number]  : [employer]  EXPIRATION DATE: 06 sep 2022  NDC#: [address]-56   Was there drug waste? No    Prior to injection, verified patient identity using patient's name and date of birth.  Due to injection administration, patient instructed to remain in clinic for 15 minutes  afterwards, and to report any adverse reaction to me immediately.    Drug Amount Wasted:  None.  Vial/Syringe: Single dose vial    Russell Rao comes into clinic today at the request of  for Urodynamics.    Order has been verified: Yes.      The following medication was given: See Above      Insertion:  14 Fr straight tipped vinyl straight catheter inserted into urethral meatus in the usual sterile fashion without difficulty.  Received 175 ml yellow urine output.     Patient did tolerate procedure well.    Patient instructed as to where to call or go for pain, fever, leakage, or decreased urine flow.     Patient instructed as to where to call or go for pain, fever, leakage, or decreased urine flow.     This service provided today was under the direct supervision of Jazmin Pereira, who was available if needed.    Nishant Sosa, EMT  8/10/2020  6:55 AM

## 2020-08-12 ENCOUNTER — TELEPHONE (OUTPATIENT)
Dept: UROLOGY | Facility: CLINIC | Age: 70
End: 2020-08-12

## 2020-08-12 NOTE — TELEPHONE ENCOUNTER
Left a very detailed VM stating her appointment was move to 8/28 since Dr Escobar will not be in clinic on 8/21. Her new appointment is Aug 28th at 1:30pm for a cysto.

## 2020-08-19 ENCOUNTER — PRE VISIT (OUTPATIENT)
Dept: UROLOGY | Facility: CLINIC | Age: 70
End: 2020-08-19

## 2020-08-19 NOTE — TELEPHONE ENCOUNTER
Reason for Visit: Cystoscopy and review UDS     Diagnosis: urinary incontinence     Orders/Procedures/Records: in system     Contact Patient: n/a     Rooming Requirements:: UA dip prior to getting ready for cystoscopy. If positive for Leuks and/or Nitrites, will not do cystoscopy. If positive, send urine for official UA / UC.      Priscila Cazares LPN  08/19/20  9:33 AM

## 2020-08-26 ENCOUNTER — OFFICE VISIT (OUTPATIENT)
Dept: OTOLARYNGOLOGY | Facility: CLINIC | Age: 70
End: 2020-08-26
Payer: COMMERCIAL

## 2020-08-26 VITALS
HEIGHT: 62 IN | OXYGEN SATURATION: 96 % | BODY MASS INDEX: 28.16 KG/M2 | TEMPERATURE: 97.9 F | WEIGHT: 153 LBS | HEART RATE: 58 BPM

## 2020-08-26 DIAGNOSIS — G51.39 HEMIFACIAL SPASM: Primary | ICD-10-CM

## 2020-08-26 ASSESSMENT — PAIN SCALES - GENERAL: PAINLEVEL: NO PAIN (1)

## 2020-08-26 ASSESSMENT — MIFFLIN-ST. JEOR: SCORE: 1177.25

## 2020-08-26 NOTE — NURSING NOTE
"Chief Complaint   Patient presents with     RECHECK     Post op DOS 6/18, botox injection     Pulse 58, temperature 97.9  F (36.6  C), temperature source Temporal, height 1.575 m (5' 2\"), weight 69.4 kg (153 lb), SpO2 96 %.     Rosetta Fung, EMT  "

## 2020-08-26 NOTE — LETTER
8/26/2020       RE: Russell Rao  [address]  [place] [address]     Dear Colleague,    Thank you for referring your patient, Russell Rao, to the [facility] EAR NOSE AND THROAT at [facility]. Please see a copy of my visit note below.    Facial Plastic and Reconstructive Surgery      Russell Rao presents today for evaluation of facial spasm. The patient has had the morbidity of facial nerve dysfunction and has significant morbidity, tightness and discomfort. Involuntary movement is also present around the eye and mouth which leads to discomfort, tightness and fatigue.     PMH, PSH, meds and allergies are unchanged.    On exam she has a narrow right palpebral fissure. Her eye closes involuntarily when she speaks. She has right facial tightness and her/his neck muscle spasms with smiling. Facial muscle excursion is limited due to the tightness. Oral commissure excursion on the right is approx 80% of normal.     Assessment:  Clonic Hemifacial spasm  Spastic torticollis  Incomplete facial paralysis  Blepharospasm    Plan:   Chemodenervation of eye, face and neck today.      Procedure: Chemodenervation with Botulinum Toxin A  Indication: Hemifacial Spasm and Hypercontracture  Injector: Kezia Davis MD      Informed consent was obtained.  The skin was cleaned with antimicrobial solution and a topical ice was placed.     The patient was asked to systematically engage the muscles in the area to be injected. The tuberculin needles were used for subdermal injection and hemostasis was obtained with light digital pressure when needed. The skin was cleaned.     A total of 52 units were injected.  Botulinum toxin A type: Botox    Please see procedure log.    The patient tolerated the procedure well and there were no complications. Post procedure care instructions were given to the patient.      Again, thank you for allowing me to participate in the care of your  patient.      Sincerely,    Kezia Davis MD

## 2020-08-26 NOTE — PROGRESS NOTES
Facial Plastic and Reconstructive Surgery      Russell Rao presents today for evaluation of facial spasm. The patient has had the morbidity of facial nerve dysfunction and has significant morbidity, tightness and discomfort. Involuntary movement is also present around the eye and mouth which leads to discomfort, tightness and fatigue.     PMH, PSH, meds and allergies are unchanged.    On exam she has a narrow right palpebral fissure. Her eye closes involuntarily when she speaks. She has right facial tightness and her/his neck muscle spasms with smiling. Facial muscle excursion is limited due to the tightness. Oral commissure excursion on the right is approx 80% of normal.     Assessment:  Clonic Hemifacial spasm  Spastic torticollis  Incomplete facial paralysis  Blepharospasm    Plan:   Chemodenervation of eye, face and neck today.      Procedure: Chemodenervation with Botulinum Toxin A  Indication: Hemifacial Spasm and Hypercontracture  Injector: Kezia Davis MD      Informed consent was obtained.  The skin was cleaned with antimicrobial solution and a topical ice was placed.     The patient was asked to systematically engage the muscles in the area to be injected. The tuberculin needles were used for subdermal injection and hemostasis was obtained with light digital pressure when needed. The skin was cleaned.     A total of 52 units were injected.  Botulinum toxin A type: Botox    Please see procedure log.    The patient tolerated the procedure well and there were no complications. Post procedure care instructions were given to the patient.

## 2020-08-28 ENCOUNTER — OFFICE VISIT (OUTPATIENT)
Dept: UROLOGY | Facility: CLINIC | Age: 70
End: 2020-08-28
Payer: COMMERCIAL

## 2020-08-28 DIAGNOSIS — T83.711D: Primary | ICD-10-CM

## 2020-08-28 ASSESSMENT — PAIN SCALES - GENERAL: PAINLEVEL: NO PAIN (0)

## 2020-08-28 NOTE — NURSING NOTE
Chief Complaint   Patient presents with     Cystoscopy     Evaluate for mesh erosion       There were no vitals taken for this visit. There is no height or weight on file to calculate BMI.    Patient Active Problem List   Diagnosis     Constipation     Migraine headache     Allergic state     S/P hysterectomy     Hyperlipidemia LDL goal <130     Advanced directives, counseling/discussion     Migraine     Obesity     Bell's palsy     Epigastric pain     Eyelid cancer     Major depression, single episode     Symptomatic cholelithiasis     Oral phase dysphagia     Dysarthria     Hemifacial spasm     Visual field constriction, bilateral     Acquired involutional ptosis of eyelid, bilateral     Visual field defect       Allergies   Allergen Reactions     Anesthetics, Halogenated [Halogenated Anesthetics] Nausea and Vomiting     Pollen Extract Itching     Other reaction(s): Unknown     Pollen [Pollen Extract] Itching       Current Outpatient Medications   Medication Sig Dispense Refill     Ibuprofen 200 MG capsule Take 200 mg by mouth every 4 hours as needed.       LORATADINE 10 MG OR TABS ONE DAILY 90 Tab 3     simvastatin (ZOCOR) 20 MG tablet Take 1 tablet by mouth At Bedtime. 90 tablet 3     erythromycin (ROMYCIN) 5 MG/GM ophthalmic ointment Place 0.5 inches into both eyes 2 times daily Apply to both upper eyelid incision with a qtip 3.5 g 0     HYDROcodone-acetaminophen (NORCO) 5-325 MG tablet Take 1-2 tablets by mouth every 4 hours as needed for moderate to severe pain 20 tablet 0     meclizine (ANTIVERT) 25 MG tablet Take 12.5 mg by mouth       ondansetron (ZOFRAN-ODT) 4 MG ODT tab Take 1-2 tablets (4-8 mg) by mouth every 8 hours as needed for nausea 4 tablet 0       Social History     Tobacco Use     Smoking status: Former Smoker     Types: Cigarettes     Last attempt to quit: 2006     Years since quittin.6     Smokeless tobacco: Never Used   Substance Use Topics     Alcohol use: No     Drug use: No        Trupti Connors CMA  2020  1:34 PM     Invasive Procedure Safety Checklist:    Procedure: Cystoscopy    Action: Complete sections and checkboxes as appropriate.    Pre-procedure:  1. Patient ID Verified with 2 identifiers (Kelley and  or MRN) : YES    2. Procedure and site verified with patient/designee (when able) : YES    3. Accurate consent documentation in medical record : YES    4. H&P (or appropriate assessment) documented in medical record : YES  H&P must be up to 30 days prior to procedure an updated within 24 hours of                 Procedure as applicable.     5. Relevant diagnostic and radiology test results appropriately labeled and displayed as applicable : YES    6. Blood products, implants, devices, and/or special equipment available for the procedure as applicable : YES    7. Procedure site(s) marked with provider initials [Exclusions: None] : NO    8. Marking not required. Reason : Yes  Procedure does not require site marking    Time Out:     Time-Out performed immediately prior to starting procedure, including verbal and active participation of all team members addressing: YES    1. Correct patient identity.  2. Confirmed that the correct side and site are marked.  3. An accurate procedure to be done.  4. Agreement on the procedure to be done.  5. Correct patient position.  6. Relevant images and results are properly labeled and appropriately displayed.  7. The need to administer antibiotics or fluids for irrigation purposes during the procedure as applicable.  8. Safety precautions based on patient history or medication use.    During Procedure: Verification of correct person, site, and procedure occurs any time the responsibility for care of the patient is transferred to another member of the care team.      The following medication was given:     MEDICATION:  Uro-jet  ROUTE: Urethral  SITE: Urethra  DOSE: 10 mL 2% lidocaine  LOT #: [id number]  : [employer]  EXPIRATION DATE:  04/22  NDC#: [address]-41   Was there drug waste? No    Prior to administration, verified patient identity using patient's name and date of birth.  Due to administration, patient instructed to remain in clinic for 15 minutes  afterwards, and to report any adverse reaction to me immediately.      Drug Amount Wasted:  None.  Vial/Syringe: Single dose vial    Trupti Connors CMA  August 28, 2020

## 2020-08-28 NOTE — PROGRESS NOTES
Reason for Visit:  Cystoscopy    Clinical Data: Ms. Russell Rao is a 69 year old female with a hx of a vaginal mesh erosion    Cystoscopy procedure:  Pt. Was consented and placed in the lithotomy position.  She was cleaned and preparred in the usual fashion.  Lidocain gel was inserted into the urethra and given time to take effect.  A 16 fr flexible cystoscope was then inserted through the urethra and into the bladder.  The urethra was wnl.  The bladder was with no trabeculation.  No tumors, diverticulae, or stones.  Bilateral u/o's were effluxing clear urine.  The cystoscope was then withdrawn.  The pt. Tolerated the procedure well.    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 Russell Rao and I will keep you updated on her progress.    Shad Escobar MD

## 2020-08-28 NOTE — LETTER
8/28/2020       RE: Russell Rao  [address]  [facility] [address]     Dear Colleague,    Thank you for referring your patient, Russell Rao, to the [facility] UROLOGY AND [facility] FOR PROSTATE AND UROLOGIC CANCERS at [facility]. Please see a copy of my visit note below.    Reason for Visit:  Cystoscopy    Clinical Data: Ms. Russell Rao is a 69 year old female with a hx of a vaginal mesh erosion    Cystoscopy procedure:  Pt. Was consented and placed in the lithotomy position.  She was cleaned and preparred in the usual fashion.  Lidocain gel was inserted into the urethra and given time to take effect.  A 16 fr flexible cystoscope was then inserted through the urethra and into the bladder.  The urethra was wnl.  The bladder was with no trabeculation.  No tumors, diverticulae, or stones.  Bilateral u/o's were effluxing clear urine.  The cystoscope was then withdrawn.  The pt. Tolerated the procedure well.    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 Russell Rao and I will keep you updated on her progress.    Shad Escobar MD

## 2020-08-28 NOTE — PATIENT INSTRUCTIONS
"Schedule a virtual visit with Dr. Escobar to discuss next steps.    It was a pleasure meeting with you today.  Thank you for allowing me and my team the privilege of caring for you today.  YOU are the reason we are here, and I truly hope we provided you with the excellent service you deserve.  Please let us know if there is anything else we can do for you so that we can be sure you are leaving completely satisfied with your care experience.        Trupti Connors CMA    AFTER YOUR CYSTOSCOPY        You have just completed a cystoscopy, or \"cysto\", which allowed your physician to learn more about your bladder (or to remove a stent placed after surgery). We suggest that you continue to avoid caffeine, fruit juice, and alcohol for the next 24 hours, however, you are encouraged to return to your normal activities.         A few things that are considered normal after your cystoscopy:     * Small amount of bleeding (or spotting) that clears within the next 24 hours     * Slight burning sensation with urination     * Sensation to of needing to avoid more frequently     * The feeling of \"air\" in your urine     * Mild discomfort that is relieved with Tylenol        Please contact our office promptly if you:     * Develop a fever above 101 degrees     * Are unable to urinate     * Develop bright red blood that does not stop     * Severe pain or swelling         Please contact our office with any concerns or questions @[email or web address].  "

## 2020-09-25 ENCOUNTER — PRE VISIT (OUTPATIENT)
Dept: UROLOGY | Facility: CLINIC | Age: 70
End: 2020-09-25

## 2020-10-02 ENCOUNTER — VIRTUAL VISIT (OUTPATIENT)
Dept: UROLOGY | Facility: CLINIC | Age: 70
End: 2020-10-02
Payer: COMMERCIAL

## 2020-10-02 DIAGNOSIS — T85.9XXA COMPLICATION OF IMPLANTED VAGINAL MESH, INITIAL ENCOUNTER: Primary | ICD-10-CM

## 2020-10-02 PROCEDURE — 99213 OFFICE O/P EST LOW 20 MIN: CPT | Mod: TEL | Performed by: OBSTETRICS & GYNECOLOGY

## 2020-10-02 ASSESSMENT — PATIENT HEALTH QUESTIONNAIRE - PHQ9: SUM OF ALL RESPONSES TO PHQ QUESTIONS 1-9: 0

## 2020-10-02 NOTE — NURSING NOTE
Chief Complaint   Patient presents with     Follow Up     discuss options       Patient Active Problem List   Diagnosis     Constipation     Migraine headache     Allergic state     S/P hysterectomy     Hyperlipidemia LDL goal <130     Advanced directives, counseling/discussion     Migraine     Obesity     Bell's palsy     Epigastric pain     Eyelid cancer     Major depression, single episode     Symptomatic cholelithiasis     Oral phase dysphagia     Dysarthria     Hemifacial spasm     Visual field constriction, bilateral     Acquired involutional ptosis of eyelid, bilateral     Visual field defect       Allergies   Allergen Reactions     Anesthetics, Halogenated [Halogenated Anesthetics] Nausea and Vomiting     Pollen Extract Itching     Other reaction(s): Unknown     Pollen [Pollen Extract] Itching       Current Outpatient Medications   Medication Sig Dispense Refill     erythromycin (ROMYCIN) 5 MG/GM ophthalmic ointment Place 0.5 inches into both eyes 2 times daily Apply to both upper eyelid incision with a qtip 3.5 g 0     HYDROcodone-acetaminophen (NORCO) 5-325 MG tablet Take 1-2 tablets by mouth every 4 hours as needed for moderate to severe pain 20 tablet 0     Ibuprofen 200 MG capsule Take 200 mg by mouth every 4 hours as needed.       LORATADINE 10 MG OR TABS ONE DAILY 90 Tab 3     meclizine (ANTIVERT) 25 MG tablet Take 12.5 mg by mouth       ondansetron (ZOFRAN-ODT) 4 MG ODT tab Take 1-2 tablets (4-8 mg) by mouth every 8 hours as needed for nausea 4 tablet 0     simvastatin (ZOCOR) 20 MG tablet Take 1 tablet by mouth At Bedtime. 90 tablet 3       Social History     Tobacco Use     Smoking status: Former Smoker     Types: Cigarettes     Quit date: 2006     Years since quittin.7     Smokeless tobacco: Never Used   Substance Use Topics     Alcohol use: No     Drug use: No       Priscila Cazares LPN  10/2/2020  12:35 PM

## 2020-10-02 NOTE — LETTER
"10/2/2020       RE: Russell Rao  [address]  [place] [place] [facility] [address]     Dear Colleague,    Thank you for referring your patient, Russell Rao, to the [facility] UROLOGY CLINIC [place] at [facility]. Please see a copy of my visit note below.    Russell Rao is a 69 year old female who is being evaluated via a billable telephone visit.      The patient has been notified of following:     \"This telephone visit will be conducted via a call between you and your physician/provider. We have found that certain health care needs can be provided without the need for a physical exam.  This service lets us provide the care you need with a short phone conversation.  If a prescription is necessary we can send it directly to your pharmacy.  If lab work is needed we can place an order for that and you can then stop by our lab to have the test done at a later time.    Telephone visits are billed at different rates depending on your insurance coverage. During this emergency period, for some insurers they may be billed the same as an in-person visit.  Please reach out to your insurance provider with any questions.    If during the course of the call the physician/provider feels a telephone visit is not appropriate, you will not be charged for this service.\"    Patient has given verbal consent for Telephone visit?  Yes    What phone number would you like to be contacted at? [phone]    How would you like to obtain your AVS? Hugo     October 2, 2020    Return visit    Patient returns today to discuss treatment options for her vaginal mesh erosion. Since her last visit she has had intermittent pain and discomfort secondary to her vaginal mesh erosion. She asked specifically what would happen if she chose not to do anything at this time. We had a long discussion about the R/B/A of a partial mesh excision to include but not limited injury to the bladder or " urethra with possible fistula formation, continued pain and bleeding. Her questions were answered. She will consider her option and talk with her family and let me know her decision.    There were no vitals taken for this visit.  She is comfortable, in no distress, non-labored breathing.      A/P: 69 year old F with vaginal mesh erosion    Pt to f/u with her decision    A total of 15 minutes were spent with the patient today, > 50% in counseling and coordination of care    Shad Escobar MD  Professor, OB/GYN  Urogynecologist    CC  Patient Care Team:  Lelo Contreras PA-C as PCP - General  Simran Pruitt ( - Clinical)  Shad Escobar MD as MD (OB/Gyn)  SELF, REFERRED      Phone call duration: 15 minutes    Shad Escobar MD

## 2020-10-02 NOTE — PROGRESS NOTES
"Russell Rao is a 69 year old female who is being evaluated via a billable telephone visit.      The patient has been notified of following:     \"This telephone visit will be conducted via a call between you and your physician/provider. We have found that certain health care needs can be provided without the need for a physical exam.  This service lets us provide the care you need with a short phone conversation.  If a prescription is necessary we can send it directly to your pharmacy.  If lab work is needed we can place an order for that and you can then stop by our lab to have the test done at a later time.    Telephone visits are billed at different rates depending on your insurance coverage. During this emergency period, for some insurers they may be billed the same as an in-person visit.  Please reach out to your insurance provider with any questions.    If during the course of the call the physician/provider feels a telephone visit is not appropriate, you will not be charged for this service.\"    Patient has given verbal consent for Telephone visit?  Yes    What phone number would you like to be contacted at? [phone]    How would you like to obtain your AVS? 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 Since her last visit she has had intermittent pain and discomfort secondary to her vaginal mesh erosion. She asked specifically what would happen if she chose not to do anything at this time. We had a long discussion about the R/B/A of a partial mesh excision to include but not limited injury to the bladder or urethra with possible fistula formation, continued pain and bleeding. Her questions were answered. She will consider her option and talk with her family and let me know her decision.    There were no vitals taken for this visit.  She is comfortable, in no distress, non-labored breathing.      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

## 2020-11-03 ENCOUNTER — DOCUMENTATION ONLY (OUTPATIENT)
Dept: CARE COORDINATION | Facility: CLINIC | Age: 70
End: 2020-11-03

## 2020-11-18 ENCOUNTER — OFFICE VISIT (OUTPATIENT)
Dept: OTOLARYNGOLOGY | Facility: CLINIC | Age: 70
End: 2020-11-18
Payer: COMMERCIAL

## 2020-11-18 VITALS — HEART RATE: 63 BPM | OXYGEN SATURATION: 96 % | BODY MASS INDEX: 27.97 KG/M2 | HEIGHT: 62 IN | WEIGHT: 152 LBS

## 2020-11-18 DIAGNOSIS — G24.5 BLEPHAROSPASM OF RIGHT EYE: ICD-10-CM

## 2020-11-18 DIAGNOSIS — M43.6 SPASTIC TORTICOLLIS: ICD-10-CM

## 2020-11-18 DIAGNOSIS — G51.31 HEMIFACIAL SPASM OF RIGHT SIDE OF FACE: Primary | ICD-10-CM

## 2020-11-18 PROCEDURE — 64616 CHEMODENERV MUSC NECK DYSTON: CPT | Mod: RT | Performed by: OTOLARYNGOLOGY

## 2020-11-18 PROCEDURE — 99212 OFFICE O/P EST SF 10 MIN: CPT | Mod: 25 | Performed by: OTOLARYNGOLOGY

## 2020-11-18 PROCEDURE — 64612 DESTROY NERVE FACE MUSCLE: CPT | Mod: 50 | Performed by: OTOLARYNGOLOGY

## 2020-11-18 ASSESSMENT — PAIN SCALES - GENERAL: PAINLEVEL: NO PAIN (0)

## 2020-11-18 ASSESSMENT — MIFFLIN-ST. JEOR: SCORE: 1167.72

## 2020-11-18 NOTE — LETTER
11/18/2020       RE: Russell Rao  [address]  [facility] [address]     Dear Colleague,    Thank you for referring your patient, Russell Rao, to the [facility] EAR NOSE AND THROAT CLINIC [place] at [facility]. Please see a copy of my visit note below.    Facial Plastic and Reconstructive Surgery      Russell Rao presents today for evaluation of facial spasm. The patient has had the morbidity of facial nerve dysfunction and has significant morbidity, tightness and discomfort. Involuntary movement is also present around the eye and mouth which leads to discomfort, tightness and fatigue.     PMH, PSH, meds and allergies are unchanged.    On exam she/he has a narrow right palpebral fissure. Her eye closes involuntarily when she speaks. She has right facial tightness and her/his neck muscle spasms with smiling. Facial muscle excursion is limited due to the tightness. Oral commissure excursion on the right is approx 80% of normal.     Assessment:  Clonic Hemifacial spasm  Spastic torticollis  Incomplete facial paralysis  Blepharospasm    Plan:   Chemodenervation of eye, face and neck today.      Procedure: Chemodenervation with Botulinum Toxin A  Indication: Hemifacial Spasm and Hypercontracture  Injector: Kezia Davis MD      Informed consent was obtained.  The skin was cleaned with antimicrobial solution and a topical ice was placed.     The patient was asked to systematically engage the muscles in the area to be injected. The tuberculin needles were used for subdermal injection and hemostasis was obtained with light digital pressure when needed. The skin was cleaned.     A total of 52 units were injected.  Botulinum toxin A type: Botox    Please see procedure log.    The patient tolerated the procedure well and there were no complications. Post procedure care instructions were given to the patient.        Again, thank you for allowing me  to participate in the care of your patient.      Sincerely,    Kezia Davis MD

## 2020-11-18 NOTE — PROGRESS NOTES
Facial Plastic and Reconstructive Surgery      Russell Rao presents today for evaluation of facial spasm. The patient has had the morbidity of facial nerve dysfunction and has significant morbidity, tightness and discomfort. Involuntary movement is also present around the eye and mouth which leads to discomfort, tightness and fatigue.     PMH, PSH, meds and allergies are unchanged.    On exam she/he has a narrow right palpebral fissure. Her eye closes involuntarily when she speaks. She has right facial tightness and her/his neck muscle spasms with smiling. Facial muscle excursion is limited due to the tightness. Oral commissure excursion on the right is approx 80% of normal.     Assessment:  Clonic Hemifacial spasm  Spastic torticollis  Incomplete facial paralysis  Blepharospasm    Plan:   Chemodenervation of eye, face and neck today.      Procedure: Chemodenervation with Botulinum Toxin A  Indication: Hemifacial Spasm and Hypercontracture  Injector: Kezia Davis MD      Informed consent was obtained.  The skin was cleaned with antimicrobial solution and a topical ice was placed.     The patient was asked to systematically engage the muscles in the area to be injected. The tuberculin needles were used for subdermal injection and hemostasis was obtained with light digital pressure when needed. The skin was cleaned.     A total of 52 units were injected.  Botulinum toxin A type: Botox    Please see procedure log.    The patient tolerated the procedure well and there were no complications. Post procedure care instructions were given to the patient.

## 2020-11-18 NOTE — NURSING NOTE
"Chief Complaint   Patient presents with     RECHECK     Follow up botox injection       Pulse 63, height 1.575 m (5' 2\"), weight 68.9 kg (152 lb), SpO2 96 %.    Rosetta Fung, EMT  "

## 2021-01-10 ENCOUNTER — HEALTH MAINTENANCE LETTER (OUTPATIENT)
Age: 71
End: 2021-01-10

## 2021-02-17 ENCOUNTER — OFFICE VISIT (OUTPATIENT)
Dept: OTOLARYNGOLOGY | Facility: CLINIC | Age: 71
End: 2021-02-17
Payer: COMMERCIAL

## 2021-02-17 VITALS — HEIGHT: 62 IN | HEART RATE: 60 BPM | OXYGEN SATURATION: 97 % | BODY MASS INDEX: 27.8 KG/M2 | TEMPERATURE: 98.6 F

## 2021-02-17 DIAGNOSIS — G51.31 HEMIFACIAL SPASM OF RIGHT SIDE OF FACE: Primary | ICD-10-CM

## 2021-02-17 DIAGNOSIS — G24.3 SPASMODIC TORTICOLLIS: ICD-10-CM

## 2021-02-17 DIAGNOSIS — G24.5 BLEPHAROSPASM OF RIGHT EYE: ICD-10-CM

## 2021-02-17 DIAGNOSIS — M43.6 SPASTIC TORTICOLLIS: ICD-10-CM

## 2021-02-17 PROCEDURE — 64616 CHEMODENERV MUSC NECK DYSTON: CPT | Mod: 51 | Performed by: OTOLARYNGOLOGY

## 2021-02-17 PROCEDURE — 99207 PR NO CHARGE INJECTABLE MED/DRUG: CPT | Mod: 25 | Performed by: OTOLARYNGOLOGY

## 2021-02-17 PROCEDURE — 64612 DESTROY NERVE FACE MUSCLE: CPT | Mod: 50 | Performed by: OTOLARYNGOLOGY

## 2021-02-17 ASSESSMENT — PAIN SCALES - GENERAL: PAINLEVEL: NO PAIN (0)

## 2021-02-17 NOTE — PROGRESS NOTES
Facial Plastic and Reconstructive Surgery      Russell Rao presents today for evaluation of facial spasm. The patient has had the morbidity of facial nerve dysfunction and has significant morbidity, tightness and discomfort. Involuntary movement is also present around the eye and mouth which leads to discomfort, tightness and fatigue.     PMH, PSH, meds and allergies are unchanged.      Clonic Hemifacial spasm  Spastic torticollis  Incomplete facial paralysis  Blepharospasm    Plan:   Chemodenervation of eye, face and neck today.      Procedure: Chemodenervation with Botulinum Toxin A  Indication: Hemifacial Spasm and Hypercontracture  Injector: Kezia Davis MD      Informed consent was obtained.  The skin was cleaned with antimicrobial solution and a topical ice was placed.     The patient was asked to systematically engage the muscles in the area to be injected. The tuberculin needles were used for subdermal injection and hemostasis was obtained with light digital pressure when needed. The skin was cleaned.     A total of 55 units were injected.  Botulinum toxin A type: Botox    Please see procedure log.    The patient tolerated the procedure well and there were no complications. Post procedure care instructions were given to the patient.

## 2021-02-17 NOTE — LETTER
2/17/2021       RE: Russell Rao  [address]  [place] [address]     Dear Colleague,    Thank you for referring your patient, Russell Rao, to the [facility] EAR NOSE AND THROAT CLINIC [place] at [facility]. Please see a copy of my visit note below.    Facial Plastic and Reconstructive Surgery      Russell Rao presents today for evaluation of facial spasm. The patient has had the morbidity of facial nerve dysfunction and has significant morbidity, tightness and discomfort. Involuntary movement is also present around the eye and mouth which leads to discomfort, tightness and fatigue.     PMH, PSH, meds and allergies are unchanged.      Clonic Hemifacial spasm  Spastic torticollis  Incomplete facial paralysis  Blepharospasm    Plan:   Chemodenervation of eye, face and neck today.      Procedure: Chemodenervation with Botulinum Toxin A  Indication: Hemifacial Spasm and Hypercontracture  Injector: Kezia Davis MD      Informed consent was obtained.  The skin was cleaned with antimicrobial solution and a topical ice was placed.     The patient was asked to systematically engage the muscles in the area to be injected. The tuberculin needles were used for subdermal injection and hemostasis was obtained with light digital pressure when needed. The skin was cleaned.     A total of 55 units were injected.  Botulinum toxin A type: Botox    Please see procedure log.    The patient tolerated the procedure well and there were no complications. Post procedure care instructions were given to the patient.        Again, thank you for allowing me to participate in the care of your patient.      Sincerely,    Kezia Davis MD

## 2021-02-17 NOTE — NURSING NOTE
"Chief Complaint   Patient presents with     RECHECK     3 month follow up       Pulse 60, temperature 98.6  F (37  C), height 1.575 m (5' 2\"), SpO2 97 %.    Rosetta Fung, EMT  "

## 2021-03-13 ENCOUNTER — HEALTH MAINTENANCE LETTER (OUTPATIENT)
Age: 71
End: 2021-03-13

## 2021-04-27 ENCOUNTER — TELEPHONE (OUTPATIENT)
Dept: OTOLARYNGOLOGY | Facility: CLINIC | Age: 71
End: 2021-04-27

## 2021-05-08 ENCOUNTER — HEALTH MAINTENANCE LETTER (OUTPATIENT)
Age: 71
End: 2021-05-08

## 2021-10-23 ENCOUNTER — HEALTH MAINTENANCE LETTER (OUTPATIENT)
Age: 71
End: 2021-10-23

## 2021-11-06 ENCOUNTER — APPOINTMENT (OUTPATIENT)
Dept: GENERAL RADIOLOGY | Facility: CLINIC | Age: 71
DRG: 179 | End: 2021-11-06
Attending: EMERGENCY MEDICINE
Payer: COMMERCIAL

## 2021-11-06 ENCOUNTER — HOSPITAL ENCOUNTER (INPATIENT)
Facility: CLINIC | Age: 71
LOS: 4 days | Discharge: HOME | DRG: 179 | End: 2021-11-10
Attending: EMERGENCY MEDICINE | Admitting: PEDIATRICS
Payer: COMMERCIAL

## 2021-11-06 ENCOUNTER — APPOINTMENT (OUTPATIENT)
Dept: CT IMAGING | Facility: CLINIC | Age: 71
DRG: 179 | End: 2021-11-06
Attending: EMERGENCY MEDICINE
Payer: COMMERCIAL

## 2021-11-06 DIAGNOSIS — U07.1 INFECTION DUE TO 2019 NOVEL CORONAVIRUS: ICD-10-CM

## 2021-11-06 DIAGNOSIS — R53.83 OTHER FATIGUE: ICD-10-CM

## 2021-11-06 DIAGNOSIS — R11.0 NAUSEA: ICD-10-CM

## 2021-11-06 DIAGNOSIS — R50.9 FEVER, UNSPECIFIED: ICD-10-CM

## 2021-11-06 LAB
ALBUMIN SERPL-MCNC: 3.4 G/DL (ref 3.4–5)
ALBUMIN UR-MCNC: NEGATIVE MG/DL
ALP SERPL-CCNC: 58 U/L (ref 40–150)
ALT SERPL W P-5'-P-CCNC: 26 U/L (ref 0–50)
ANION GAP SERPL CALCULATED.3IONS-SCNC: 8 MMOL/L (ref 3–14)
APPEARANCE UR: CLEAR
AST SERPL W P-5'-P-CCNC: 39 U/L (ref 0–45)
BASOPHILS # BLD AUTO: 0 10E3/UL (ref 0–0.2)
BASOPHILS NFR BLD AUTO: 0 %
BILIRUB SERPL-MCNC: 0.5 MG/DL (ref 0.2–1.3)
BILIRUB UR QL STRIP: NEGATIVE
BUN SERPL-MCNC: 12 MG/DL (ref 7–30)
CALCIUM SERPL-MCNC: 8.4 MG/DL (ref 8.5–10.1)
CHLORIDE BLD-SCNC: 106 MMOL/L (ref 94–109)
CO2 SERPL-SCNC: 24 MMOL/L (ref 20–32)
COLOR UR AUTO: ABNORMAL
CREAT SERPL-MCNC: 0.44 MG/DL (ref 0.52–1.04)
CRP SERPL-MCNC: 15 MG/L (ref 0–8)
EOSINOPHIL # BLD AUTO: 0 10E3/UL (ref 0–0.7)
EOSINOPHIL NFR BLD AUTO: 0 %
ERYTHROCYTE [DISTWIDTH] IN BLOOD BY AUTOMATED COUNT: 11.9 % (ref 10–15)
ERYTHROCYTE [SEDIMENTATION RATE] IN BLOOD BY WESTERGREN METHOD: 24 MM/HR (ref 0–30)
GFR SERPL CREATININE-BSD FRML MDRD: >90 ML/MIN/1.73M2
GLUCOSE BLD-MCNC: 83 MG/DL (ref 70–99)
GLUCOSE UR STRIP-MCNC: NEGATIVE MG/DL
HCT VFR BLD AUTO: 39.1 % (ref 35–47)
HGB BLD-MCNC: 13.4 G/DL (ref 11.7–15.7)
HGB UR QL STRIP: ABNORMAL
HOLD SPECIMEN: NORMAL
IMM GRANULOCYTES # BLD: 0 10E3/UL
IMM GRANULOCYTES NFR BLD: 0 %
KETONES UR STRIP-MCNC: 60 MG/DL
LACTATE SERPL-SCNC: 0.7 MMOL/L (ref 0.7–2)
LEUKOCYTE ESTERASE UR QL STRIP: ABNORMAL
LYMPHOCYTES # BLD AUTO: 0.9 10E3/UL (ref 0.8–5.3)
LYMPHOCYTES NFR BLD AUTO: 28 %
MCH RBC QN AUTO: 30.4 PG (ref 26.5–33)
MCHC RBC AUTO-ENTMCNC: 34.3 G/DL (ref 31.5–36.5)
MCV RBC AUTO: 89 FL (ref 78–100)
MONOCYTES # BLD AUTO: 0.3 10E3/UL (ref 0–1.3)
MONOCYTES NFR BLD AUTO: 10 %
MUCOUS THREADS #/AREA URNS LPF: PRESENT /LPF
NEUTROPHILS # BLD AUTO: 1.8 10E3/UL (ref 1.6–8.3)
NEUTROPHILS NFR BLD AUTO: 62 %
NITRATE UR QL: NEGATIVE
NRBC # BLD AUTO: 0 10E3/UL
NRBC BLD AUTO-RTO: 0 /100
PH UR STRIP: 6 [PH] (ref 5–7)
PLATELET # BLD AUTO: 156 10E3/UL (ref 150–450)
POTASSIUM BLD-SCNC: 3.6 MMOL/L (ref 3.4–5.3)
PROCALCITONIN SERPL-MCNC: <0.05 NG/ML
PROT SERPL-MCNC: 7.1 G/DL (ref 6.8–8.8)
RBC # BLD AUTO: 4.41 10E6/UL (ref 3.8–5.2)
RBC URINE: 6 /HPF
SARS-COV-2 RNA RESP QL NAA+PROBE: POSITIVE
SODIUM SERPL-SCNC: 138 MMOL/L (ref 133–144)
SP GR UR STRIP: 1.01 (ref 1–1.03)
SQUAMOUS EPITHELIAL: <1 /HPF
TROPONIN I SERPL-MCNC: 0.06 UG/L (ref 0–0.04)
TROPONIN I SERPL-MCNC: 0.06 UG/L (ref 0–0.04)
UROBILINOGEN UR STRIP-MCNC: NORMAL MG/DL
WBC # BLD AUTO: 3 10E3/UL (ref 4–11)
WBC URINE: 20 /HPF

## 2021-11-06 PROCEDURE — 71275 CT ANGIOGRAPHY CHEST: CPT

## 2021-11-06 PROCEDURE — C9803 HOPD COVID-19 SPEC COLLECT: HCPCS | Performed by: EMERGENCY MEDICINE

## 2021-11-06 PROCEDURE — 250N000009 HC RX 250: Performed by: EMERGENCY MEDICINE

## 2021-11-06 PROCEDURE — 258N000003 HC RX IP 258 OP 636: Performed by: EMERGENCY MEDICINE

## 2021-11-06 PROCEDURE — 36415 COLL VENOUS BLD VENIPUNCTURE: CPT | Performed by: EMERGENCY MEDICINE

## 2021-11-06 PROCEDURE — 71275 CT ANGIOGRAPHY CHEST: CPT | Mod: 26 | Performed by: RADIOLOGY

## 2021-11-06 PROCEDURE — 96367 TX/PROPH/DG ADDL SEQ IV INF: CPT | Performed by: EMERGENCY MEDICINE

## 2021-11-06 PROCEDURE — 71045 X-RAY EXAM CHEST 1 VIEW: CPT | Mod: 26 | Performed by: RADIOLOGY

## 2021-11-06 PROCEDURE — 87086 URINE CULTURE/COLONY COUNT: CPT | Performed by: EMERGENCY MEDICINE

## 2021-11-06 PROCEDURE — 83605 ASSAY OF LACTIC ACID: CPT | Performed by: EMERGENCY MEDICINE

## 2021-11-06 PROCEDURE — 85025 COMPLETE CBC W/AUTO DIFF WBC: CPT | Performed by: EMERGENCY MEDICINE

## 2021-11-06 PROCEDURE — 71045 X-RAY EXAM CHEST 1 VIEW: CPT

## 2021-11-06 PROCEDURE — 81003 URINALYSIS AUTO W/O SCOPE: CPT | Performed by: EMERGENCY MEDICINE

## 2021-11-06 PROCEDURE — 85652 RBC SED RATE AUTOMATED: CPT | Performed by: EMERGENCY MEDICINE

## 2021-11-06 PROCEDURE — 84484 ASSAY OF TROPONIN QUANT: CPT | Performed by: EMERGENCY MEDICINE

## 2021-11-06 PROCEDURE — 80053 COMPREHEN METABOLIC PANEL: CPT | Performed by: EMERGENCY MEDICINE

## 2021-11-06 PROCEDURE — 86140 C-REACTIVE PROTEIN: CPT | Performed by: EMERGENCY MEDICINE

## 2021-11-06 PROCEDURE — 250N000011 HC RX IP 250 OP 636: Performed by: EMERGENCY MEDICINE

## 2021-11-06 PROCEDURE — 84145 PROCALCITONIN (PCT): CPT | Performed by: EMERGENCY MEDICINE

## 2021-11-06 PROCEDURE — 99285 EMERGENCY DEPT VISIT HI MDM: CPT | Mod: 25 | Performed by: EMERGENCY MEDICINE

## 2021-11-06 PROCEDURE — 96365 THER/PROPH/DIAG IV INF INIT: CPT | Mod: 59 | Performed by: EMERGENCY MEDICINE

## 2021-11-06 PROCEDURE — 96361 HYDRATE IV INFUSION ADD-ON: CPT | Performed by: EMERGENCY MEDICINE

## 2021-11-06 PROCEDURE — 93010 ELECTROCARDIOGRAM REPORT: CPT | Performed by: EMERGENCY MEDICINE

## 2021-11-06 PROCEDURE — U0003 INFECTIOUS AGENT DETECTION BY NUCLEIC ACID (DNA OR RNA); SEVERE ACUTE RESPIRATORY SYNDROME CORONAVIRUS 2 (SARS-COV-2) (CORONAVIRUS DISEASE [COVID-19]), AMPLIFIED PROBE TECHNIQUE, MAKING USE OF HIGH THROUGHPUT TECHNOLOGIES AS DESCRIBED BY CMS-2020-01-R: HCPCS | Performed by: EMERGENCY MEDICINE

## 2021-11-06 PROCEDURE — XW033E5 INTRODUCTION OF REMDESIVIR ANTI-INFECTIVE INTO PERIPHERAL VEIN, PERCUTANEOUS APPROACH, NEW TECHNOLOGY GROUP 5: ICD-10-PCS | Performed by: INTERNAL MEDICINE

## 2021-11-06 PROCEDURE — 87040 BLOOD CULTURE FOR BACTERIA: CPT | Performed by: EMERGENCY MEDICINE

## 2021-11-06 PROCEDURE — 250N000013 HC RX MED GY IP 250 OP 250 PS 637: Performed by: EMERGENCY MEDICINE

## 2021-11-06 PROCEDURE — 96375 TX/PRO/DX INJ NEW DRUG ADDON: CPT | Performed by: EMERGENCY MEDICINE

## 2021-11-06 PROCEDURE — 120N000002 HC R&B MED SURG/OB UMMC

## 2021-11-06 RX ORDER — ASPIRIN 81 MG/1
162 TABLET, CHEWABLE ORAL ONCE
Status: COMPLETED | OUTPATIENT
Start: 2021-11-06 | End: 2021-11-06

## 2021-11-06 RX ORDER — SODIUM CHLORIDE 9 MG/ML
INJECTION, SOLUTION INTRAVENOUS CONTINUOUS
Status: DISCONTINUED | OUTPATIENT
Start: 2021-11-06 | End: 2021-11-08

## 2021-11-06 RX ORDER — DEXAMETHASONE SODIUM PHOSPHATE 10 MG/ML
6 INJECTION, SOLUTION INTRAMUSCULAR; INTRAVENOUS ONCE
Status: COMPLETED | OUTPATIENT
Start: 2021-11-06 | End: 2021-11-06

## 2021-11-06 RX ORDER — CEFTRIAXONE 1 G/1
1 INJECTION, POWDER, FOR SOLUTION INTRAMUSCULAR; INTRAVENOUS ONCE
Status: COMPLETED | OUTPATIENT
Start: 2021-11-06 | End: 2021-11-06

## 2021-11-06 RX ORDER — IOPAMIDOL 755 MG/ML
55 INJECTION, SOLUTION INTRAVASCULAR ONCE
Status: COMPLETED | OUTPATIENT
Start: 2021-11-06 | End: 2021-11-06

## 2021-11-06 RX ORDER — MULTIPLE VITAMINS W/ MINERALS TAB 9MG-400MCG
1 TAB ORAL DAILY
COMMUNITY

## 2021-11-06 RX ORDER — ACETAMINOPHEN 325 MG/1
650 TABLET ORAL ONCE
Status: COMPLETED | OUTPATIENT
Start: 2021-11-06 | End: 2021-11-06

## 2021-11-06 RX ADMIN — ASPIRIN 81 MG CHEWABLE TABLET 162 MG: 81 TABLET CHEWABLE at 15:39

## 2021-11-06 RX ADMIN — SODIUM CHLORIDE 1000 ML: 9 INJECTION, SOLUTION INTRAVENOUS at 13:47

## 2021-11-06 RX ADMIN — REMDESIVIR 200 MG: 100 INJECTION, POWDER, LYOPHILIZED, FOR SOLUTION INTRAVENOUS at 15:40

## 2021-11-06 RX ADMIN — SODIUM CHLORIDE: 9 INJECTION, SOLUTION INTRAVENOUS at 16:56

## 2021-11-06 RX ADMIN — ACETAMINOPHEN 650 MG: 325 TABLET, FILM COATED ORAL at 13:47

## 2021-11-06 RX ADMIN — SODIUM CHLORIDE 50 ML: 9 INJECTION, SOLUTION INTRAVENOUS at 16:54

## 2021-11-06 RX ADMIN — IOPAMIDOL 55 ML: 755 INJECTION, SOLUTION INTRAVENOUS at 16:32

## 2021-11-06 RX ADMIN — CEFTRIAXONE SODIUM 1 G: 1 INJECTION, POWDER, FOR SOLUTION INTRAMUSCULAR; INTRAVENOUS at 17:11

## 2021-11-06 RX ADMIN — DEXAMETHASONE SODIUM PHOSPHATE 6 MG: 10 INJECTION INTRAMUSCULAR; INTRAVENOUS at 15:40

## 2021-11-06 ASSESSMENT — ENCOUNTER SYMPTOMS
SHORTNESS OF BREATH: 0
COUGH: 0
HEMATURIA: 0
DYSURIA: 0
APPETITE CHANGE: 0
NAUSEA: 1
SORE THROAT: 0
FATIGUE: 1
FEVER: 1

## 2021-11-06 ASSESSMENT — ACTIVITIES OF DAILY LIVING (ADL)
ADLS_ACUITY_SCORE: 8

## 2021-11-06 ASSESSMENT — MIFFLIN-ST. JEOR: SCORE: 1153.65

## 2021-11-06 NOTE — ED TRIAGE NOTES
Pt BIBA for fevers for the last 6 days. Afebrile for EMS. Started with cold symptoms but now has nausea, pain in R flank area, and under rib. Hx of bladder infections.

## 2021-11-06 NOTE — ED PROVIDER NOTES
ED Provider Note  [facility]      History     Chief Complaint   Patient presents with     Fever     The history is provided by the patient.     Russell Rao is a 71 year old female who presents to the ED with fatigue, nausea, body aches, and decreased appetite.  She reports experiencing cold-like symptoms for the past 6 days, symptoms include fatigue, body aches, nausea, and fever. Patient denies experiencing cough, sore throat, change in taste and/or smell, chest pain, or shortness of breath. Patient denies having any sick contacts.  Patient reports being on vaccinated for COVID-19. Patient reports experiencing increased urinary frequency for 3 weeks and reports experiencing right flank pain for 2 days.  Patient denies experiencing dysuria or hematuria, both have been symptoms associated with past bladder infections.  All other ROS negative.    Past Medical History  Past Medical History:   Diagnosis Date     Allergic rhinitis      Arthritis      Bell's palsy      Gastroesophageal reflux disease      HLD (hyperlipidemia)      Migraine headaches 1/2/2009     Other and unspecified hyperlipidemia      PONV (postoperative nausea and vomiting)      Vertigo      Past Surgical History:   Procedure Laterality Date     BLEPHAROPLASTY BILATERAL Bilateral 2/21/2019    Procedure: Bilateral Upper Eyelid Blepharoplasty;  Surgeon: Kezia Davis MD;  Location: [facility] OR     BLEPHAROPLASTY BILATERAL Bilateral 6/18/2020    Procedure: Bilateral upper eyelid blepharoplasty, bilateral pretrichial brow lift;  Surgeon: Kezia Davis MD;  Location: [facility] OR     HYSTERECTOMY, PAP NO LONGER INDICATED      TVH and prolift repair, ovaries remain     TUBAL LIGATION       LORATADINE 10 MG OR TABS  simvastatin (ZOCOR) 20 MG tablet  erythromycin (ROMYCIN) 5 MG/GM ophthalmic ointment  HYDROcodone-acetaminophen (NORCO) 5-325 MG tablet  Ibuprofen 200 MG capsule  meclizine (ANTIVERT) 25 MG tablet  ondansetron  "(ZOFRAN-ODT) 4 MG ODT tab      Allergies   Allergen Reactions     Anesthetics, Halogenated [Halogenated Anesthetics] Nausea and Vomiting     Pollen Extract Itching     Other reaction(s): Unknown     Pollen [Pollen Extract] Itching     Family History  Family History   Problem Relation Age of Onset     Breast Cancer Maternal Grandmother      Asthma Mother      Osteoporosis Mother      Hypertension Father      Cancer - colorectal Father      Alcohol/Drug Father      Alzheimer Disease Father         dementia     Hypertension Brother      Cerebrovascular Disease Brother              Alcohol/Drug Brother      Lipids Brother      Genitourinary Problems Sister         kidney stones     Gynecology Sister         hyst     Cerebrovascular Disease Brother      Social History   Social History     Tobacco Use     Smoking status: Former Smoker     Types: Cigarettes     Quit date: 2006     Years since quitting: 15.8     Smokeless tobacco: Never Used   Substance Use Topics     Alcohol use: No     Drug use: No      Past medical history, past surgical history, medications, allergies, family history, and social history were reviewed with the patient. No additional pertinent items.       Review of Systems   Constitutional: Positive for fatigue and fever. Negative for appetite change.   HENT: Negative for sore throat.    Respiratory: Negative for cough and shortness of breath.    Cardiovascular: Negative for chest pain.   Gastrointestinal: Positive for nausea.   Genitourinary: Negative for dysuria and hematuria.   Musculoskeletal:        Body aches     A complete review of systems was performed with pertinent positives and negatives noted in the HPI, and all other systems negative.    Physical Exam   BP: 139/73  Pulse: 75  Temp: 99.7  F (37.6  C)  Resp: 18  Height: 157.5 cm (5' 2\")  Weight: 68 kg (150 lb)  SpO2: 93 %  Physical Exam  Vitals and nursing note reviewed.   Constitutional:       General: She is not in acute " distress.     Appearance: She is not diaphoretic.   HENT:      Head: Atraumatic.      Mouth/Throat:      Pharynx: No oropharyngeal exudate.   Eyes:      General: No scleral icterus.     Pupils: Pupils are equal, round, and reactive to light.   Cardiovascular:      Rate and Rhythm: Normal rate and regular rhythm.      Heart sounds: Normal heart sounds.   Pulmonary:      Effort: No respiratory distress.      Breath sounds: Normal breath sounds.   Abdominal:      General: Bowel sounds are normal.      Palpations: Abdomen is soft.      Tenderness: There is no abdominal tenderness.   Musculoskeletal:         General: No tenderness.   Skin:     General: Skin is warm.      Findings: No rash.   Neurological:      General: No focal deficit present.      Mental Status: She is oriented to person, place, and time.           ED Course     ED Course as of 11/26/21 0005 Fri Nov 26, 2021   0003 EKG 12 lead    2:03 PM  The patient was seen and examined by Arsen Chaudhry MD in Room ED20.     Procedures            EKG Interpretation:      Interpreted by Arsen Chaudhry MD  Time reviewed: per Epic   Symptoms at time of EKG:none   Rhythm: normal sinus   Rate: normal  Axis: normal  Ectopy: none  Conduction: normal  ST Segments/ T Waves: No ST-T wave changes  Q Waves: none  Comparison to prior: Unchanged    Clinical Impression: normal EKG             Results for orders placed or performed during the hospital encounter of 11/06/21   XR Chest Port 1 View     Status: None    Narrative    Chest one view portable upright    HISTORY: Fever hypoxic    COMPARISON STUDY: 9/30/2011    Findings: Cardiac silhouette is nonenlarged. Normal left basilar  atelectasis.Lungs are otherwise clear.       Impression    IMPRESSION: No acute airspace disease    JONAH MCKEON MD         SYSTEM ID:  [id number]   Lactic acid whole blood     Status: Normal   Result Value Ref Range    Lactic Acid 0.7 0.7 - 2.0 mmol/L   Extra Blue Top Tube     Status: None   Result  Value Ref Range    Hold Specimen JIC    Extra Red Top Tube     Status: None   Result Value Ref Range    Hold Specimen JIC    Extra Green Top (Lithium Heparin) Tube     Status: None   Result Value Ref Range    Hold Specimen JIC    Extra Purple Top Tube     Status: None   Result Value Ref Range    Hold Specimen JIC    Symptomatic COVID-19 Virus (Coronavirus) by PCR Nasopharyngeal     Status: Abnormal    Specimen: Nasopharyngeal; Swab   Result Value Ref Range    SARS CoV2 PCR Positive (A) Negative    Narrative    Testing was performed using the Xpert Xpress SARS-CoV-2 Assay on the  Cepheid Gene-Xpert Instrument Systems. Additional information about  this Emergency Use Authorization (EUA) assay can be found via the Lab  Guide. This test should be ordered for the detection of SARS-CoV-2 in  individuals who meet SARS-CoV-2 clinical and/or epidemiological  criteria. Test performance is unknown in asymptomatic patients. This  test is for in vitro diagnostic use under the FDA EUA for  laboratories certified under CLIA to perform high complexity testing.  This test has not been FDA cleared or approved. A negative result  does not rule out the presence of PCR inhibitors in the specimen or  target RNA in concentration below the limit of detection for the  assay. The possibility of a false negative should be considered if  the patient's recent exposure or clinical presentation suggests  COVID-19. This test was validated by the [facility]. This laboratory is certified under  the Clinical Laboratory Improvement Amendments of 1988 (CLIA-88) as  qualified to perform high complexity laboratory testing.     Comprehensive metabolic panel     Status: Abnormal   Result Value Ref Range    Sodium 138 133 - 144 mmol/L    Potassium 3.6 3.4 - 5.3 mmol/L    Chloride 106 94 - 109 mmol/L    Carbon Dioxide (CO2) 24 20 - 32 mmol/L    Anion Gap 8 3 - 14 mmol/L    Urea Nitrogen 12 7 - 30 mg/dL     Creatinine 0.44 (L) 0.52 - 1.04 mg/dL    Calcium 8.4 (L) 8.5 - 10.1 mg/dL    Glucose 83 70 - 99 mg/dL    Alkaline Phosphatase 58 40 - 150 U/L    AST 39 0 - 45 U/L    ALT 26 0 - 50 U/L    Protein Total 7.1 6.8 - 8.8 g/dL    Albumin 3.4 3.4 - 5.0 g/dL    Bilirubin Total 0.5 0.2 - 1.3 mg/dL    GFR Estimate >90 >60 mL/min/1.73m2   CRP inflammation     Status: Abnormal   Result Value Ref Range    CRP Inflammation 15.0 (H) 0.0 - 8.0 mg/L   Procalcitonin     Status: Normal   Result Value Ref Range    Procalcitonin <0.05 <0.05 ng/mL   Troponin I     Status: Abnormal   Result Value Ref Range    Troponin I 0.061 (H) 0.000 - 0.045 ug/L   CBC with platelets and differential     Status: Abnormal   Result Value Ref Range    WBC Count 3.0 (L) 4.0 - 11.0 10e3/uL    RBC Count 4.41 3.80 - 5.20 10e6/uL    Hemoglobin 13.4 11.7 - 15.7 g/dL    Hematocrit 39.1 35.0 - 47.0 %    MCV 89 78 - 100 fL    MCH 30.4 26.5 - 33.0 pg    MCHC 34.3 31.5 - 36.5 g/dL    RDW 11.9 10.0 - 15.0 %    Platelet Count 156 150 - 450 10e3/uL    % Neutrophils 62 %    % Lymphocytes 28 %    % Monocytes 10 %    % Eosinophils 0 %    % Basophils 0 %    % Immature Granulocytes 0 %    NRBCs per 100 WBC 0 <1 /100    Absolute Neutrophils 1.8 1.6 - 8.3 10e3/uL    Absolute Lymphocytes 0.9 0.8 - 5.3 10e3/uL    Absolute Monocytes 0.3 0.0 - 1.3 10e3/uL    Absolute Eosinophils 0.0 0.0 - 0.7 10e3/uL    Absolute Basophils 0.0 0.0 - 0.2 10e3/uL    Absolute Immature Granulocytes 0.0 <=0.0 10e3/uL    Absolute NRBCs 0.0 10e3/uL   [place] Draw     Status: None (In process)    Narrative    The following orders were created for panel order [place] Draw.  Procedure                               Abnormality         Status                     ---------                               -----------         ------                     Extra Blood Culture Bottle[[id number]]                       In process                 Extra Blue Top Tube[[id number]]                              Final result                Extra Red Top Tube[[id number]]                               Final result               Extra Green Top (Lithium...[[id number]]                      Final result               Extra Purple Top Tube[[id number]]                            Final result                 Please view results for these tests on the individual orders.   CBC with platelets differential     Status: Abnormal    Narrative    The following orders were created for panel order CBC with platelets differential.  Procedure                               Abnormality         Status                     ---------                               -----------         ------                     CBC with platelets and d...[[id number]]  Abnormal            Final result                 Please view results for these tests on the individual orders.     Medications   0.9% sodium chloride BOLUS (1,000 mLs Intravenous New Bag 11/6/21 1347)     Followed by   sodium chloride 0.9% infusion (has no administration in time range)   dexamethasone PF (DECADRON) injection 6 mg (has no administration in time range)   remdesivir 200 mg in sodium chloride 0.9 % 250 mL intermittent infusion (has no administration in time range)     Followed by   0.9% sodium chloride BOLUS (has no administration in time range)   remdesivir 100 mg in sodium chloride 0.9 % 250 mL intermittent infusion (has no administration in time range)     And   0.9% sodium chloride BOLUS (has no administration in time range)   acetaminophen (TYLENOL) tablet 650 mg (650 mg Oral Given 11/6/21 1347)        Assessments & Plan (with Medical Decision Making)     71 year old female who presents to the ED with fatigue, nausea, body aches, and decreased appetite.  Patient's vital signs upon presentation to the emergency department remarkable for temperature 99.7  F and decreased pulse ox at 93% on room air but otherwise stable with blood pressure 139/70, rate is 18, pulse of 75.  EKG obtained which revealed normal sinus  rhythm without any acute ischemic changes.  IV established, labs drawn sent reviewed document epic and remarkable for troponin 0 0.093, UA with 20 whites, COVID-19 swab positive, CBC with leukopenia 3.0, mildly elevated CRP at 15, lactic acid normal at 0.7, otherwise normal CBC electrolytes and procalcitonin.  Patient was given acetaminophen 1 g p.o. here in the emergency room along with aspirin 106 p.o. followed by 6 mg IV and remdesivir load 200 mg IV.  She is also given ceftriaxone for presumptive UTI.  Given her hypoxia on room air patient had an x-ray of the chest reviewed by me and interpreted as abscess.  Given her bump in troponin she was sent to CT for imaging of the chest no evidence of pulmonary embolus.  Case discussed with medicine hospitalist service with agreement to admit for stabilization of her COVID-19    I have reviewed the nursing notes. I have reviewed the findings, diagnosis, plan and need for follow up with the patient.    New Prescriptions    No medications on file       Final diagnoses:   Infection due to 2019 novel coronavirus     I, Lloyd Nickerson, am serving as a trained medical scribe to document services personally performed by Arsen Chaudhry MD, based on the provider's statements to me.      IArsen MD, was physically present and have reviewed and verified the accuracy of this note documented by Lloyd Nickerson.     --  Arsen Chaudhry  [facility] EMERGENCY DEPARTMENT  11/6/2021     Arsen Chaudhry MD  11/26/21 0008

## 2021-11-06 NOTE — ED NOTES
Bed: ED20  Expected date: 11/6/21  Expected time: 12:46 PM  Means of arrival: Ambulance  Comments:  Oswaldo Bennett    70 y/o Female    Flu  Blood Infection  Weakness    Triaged:  YELLOW

## 2021-11-07 LAB
ALBUMIN UR-MCNC: NEGATIVE MG/DL
APPEARANCE UR: CLEAR
BACTERIA UR CULT: NORMAL
BILIRUB UR QL STRIP: NEGATIVE
COLOR UR AUTO: ABNORMAL
CREAT SERPL-MCNC: 0.46 MG/DL (ref 0.52–1.04)
GFR SERPL CREATININE-BSD FRML MDRD: >90 ML/MIN/1.73M2
GLUCOSE UR STRIP-MCNC: NEGATIVE MG/DL
HGB UR QL STRIP: ABNORMAL
HOLD SPECIMEN: NORMAL
KETONES UR STRIP-MCNC: 100 MG/DL
LEUKOCYTE ESTERASE UR QL STRIP: ABNORMAL
NITRATE UR QL: NEGATIVE
PH UR STRIP: 6 [PH] (ref 5–7)
SP GR UR STRIP: 1.01 (ref 1–1.03)
TROPONIN I SERPL-MCNC: 0.08 UG/L (ref 0–0.04)
TROPONIN I SERPL-MCNC: 0.09 UG/L (ref 0–0.04)
TROPONIN I SERPL-MCNC: 0.1 UG/L (ref 0–0.04)
TROPONIN I SERPL-MCNC: 0.1 UG/L (ref 0–0.04)
UROBILINOGEN UR STRIP-MCNC: NORMAL MG/DL

## 2021-11-07 PROCEDURE — G0378 HOSPITAL OBSERVATION PER HR: HCPCS

## 2021-11-07 PROCEDURE — 258N000003 HC RX IP 258 OP 636: Performed by: PEDIATRICS

## 2021-11-07 PROCEDURE — 250N000011 HC RX IP 250 OP 636: Performed by: INTERNAL MEDICINE

## 2021-11-07 PROCEDURE — 96361 HYDRATE IV INFUSION ADD-ON: CPT | Performed by: EMERGENCY MEDICINE

## 2021-11-07 PROCEDURE — 36415 COLL VENOUS BLD VENIPUNCTURE: CPT | Performed by: PEDIATRICS

## 2021-11-07 PROCEDURE — 84484 ASSAY OF TROPONIN QUANT: CPT | Performed by: INTERNAL MEDICINE

## 2021-11-07 PROCEDURE — 81003 URINALYSIS AUTO W/O SCOPE: CPT | Performed by: PEDIATRICS

## 2021-11-07 PROCEDURE — 96372 THER/PROPH/DIAG INJ SC/IM: CPT | Performed by: PEDIATRICS

## 2021-11-07 PROCEDURE — 99207 PR CDG-CODE CATEGORY CHANGED: CPT | Performed by: PEDIATRICS

## 2021-11-07 PROCEDURE — 82565 ASSAY OF CREATININE: CPT | Performed by: PEDIATRICS

## 2021-11-07 PROCEDURE — 250N000013 HC RX MED GY IP 250 OP 250 PS 637: Performed by: PEDIATRICS

## 2021-11-07 PROCEDURE — 36415 COLL VENOUS BLD VENIPUNCTURE: CPT | Performed by: INTERNAL MEDICINE

## 2021-11-07 PROCEDURE — 36415 COLL VENOUS BLD VENIPUNCTURE: CPT | Performed by: EMERGENCY MEDICINE

## 2021-11-07 PROCEDURE — 250N000013 HC RX MED GY IP 250 OP 250 PS 637: Performed by: INTERNAL MEDICINE

## 2021-11-07 PROCEDURE — 250N000011 HC RX IP 250 OP 636: Performed by: PEDIATRICS

## 2021-11-07 PROCEDURE — 99223 1ST HOSP IP/OBS HIGH 75: CPT | Mod: AI | Performed by: PEDIATRICS

## 2021-11-07 PROCEDURE — 87040 BLOOD CULTURE FOR BACTERIA: CPT | Performed by: EMERGENCY MEDICINE

## 2021-11-07 PROCEDURE — 84484 ASSAY OF TROPONIN QUANT: CPT | Performed by: PEDIATRICS

## 2021-11-07 PROCEDURE — 120N000002 HC R&B MED SURG/OB UMMC

## 2021-11-07 PROCEDURE — 250N000009 HC RX 250: Performed by: PEDIATRICS

## 2021-11-07 PROCEDURE — 258N000003 HC RX IP 258 OP 636

## 2021-11-07 RX ORDER — DEXAMETHASONE 2 MG/1
6 TABLET ORAL DAILY
Status: DISCONTINUED | OUTPATIENT
Start: 2021-11-07 | End: 2021-11-07

## 2021-11-07 RX ORDER — LIDOCAINE 40 MG/G
CREAM TOPICAL
Status: DISCONTINUED | OUTPATIENT
Start: 2021-11-07 | End: 2021-11-10 | Stop reason: HOSPADM

## 2021-11-07 RX ORDER — SODIUM CHLORIDE 9 MG/ML
INJECTION, SOLUTION INTRAVENOUS
Status: COMPLETED
Start: 2021-11-07 | End: 2021-11-07

## 2021-11-07 RX ORDER — LACTOBACILLUS RHAMNOSUS GG 10B CELL
1 CAPSULE ORAL 2 TIMES DAILY
Status: DISCONTINUED | OUTPATIENT
Start: 2021-11-07 | End: 2021-11-10 | Stop reason: HOSPADM

## 2021-11-07 RX ORDER — CEFTRIAXONE 1 G/1
1 INJECTION, POWDER, FOR SOLUTION INTRAMUSCULAR; INTRAVENOUS EVERY 24 HOURS
Status: DISCONTINUED | OUTPATIENT
Start: 2021-11-07 | End: 2021-11-08

## 2021-11-07 RX ORDER — SIMVASTATIN 10 MG
20 TABLET ORAL AT BEDTIME
Status: DISCONTINUED | OUTPATIENT
Start: 2021-11-07 | End: 2021-11-10 | Stop reason: HOSPADM

## 2021-11-07 RX ADMIN — CEFTRIAXONE SODIUM 1 G: 1 INJECTION, POWDER, FOR SOLUTION INTRAMUSCULAR; INTRAVENOUS at 17:53

## 2021-11-07 RX ADMIN — SODIUM CHLORIDE: 9 INJECTION, SOLUTION INTRAVENOUS at 08:41

## 2021-11-07 RX ADMIN — SODIUM CHLORIDE: 9 INJECTION, SOLUTION INTRAVENOUS at 17:59

## 2021-11-07 RX ADMIN — Medication 1 MG: at 22:12

## 2021-11-07 RX ADMIN — SODIUM CHLORIDE 50 ML: 9 INJECTION, SOLUTION INTRAVENOUS at 17:23

## 2021-11-07 RX ADMIN — REMDESIVIR 100 MG: 100 INJECTION, POWDER, LYOPHILIZED, FOR SOLUTION INTRAVENOUS at 16:26

## 2021-11-07 RX ADMIN — SIMVASTATIN 20 MG: 10 TABLET, FILM COATED ORAL at 22:11

## 2021-11-07 RX ADMIN — Medication 1 CAPSULE: at 20:53

## 2021-11-07 RX ADMIN — ENOXAPARIN SODIUM 40 MG: 40 INJECTION SUBCUTANEOUS at 08:41

## 2021-11-07 ASSESSMENT — ACTIVITIES OF DAILY LIVING (ADL)
ADLS_ACUITY_SCORE: 8

## 2021-11-07 NOTE — CONSULTS
Care Management Initial Consult    General Information  Assessment completed with: patient       Primary Care Provider verified and updated as needed:   Verified that PCP is Dr. Hallie López with [facility]  Readmission within the last 30 days:   No        Advance Care Planning:     Not addressed       Communication Assessment  Patient's communication style: spoken language (English or Bilingual)    Hearing Difficulty or Deaf: no   Wear Glasses or Blind: yes    Cognitive  Cognitive/Neuro/Behavioral: WDL                      Living Environment:   People in home:     Lives alone  Current living Arrangements:  apartment      Able to return to prior arrangements:  yes       Family/Social Support:  Care provided by:  self  Provides care for:  No one else                Description of Support System:    Adequate family support       Current Resources:   Patient receiving home care services:  No     Community Resources:  [employer] mobility  Equipment currently used at home:  None  Supplies currently used at home:  None    Employment/Financial:  Employment Status:  N/A        Financial Concerns:   No          Lifestyle & Psychosocial Needs:  Social Determinants of Health     Tobacco Use: Medium Risk     Smoking Tobacco Use: Former Smoker     Smokeless Tobacco Use: Never Used   Alcohol Use: Not on file   Financial Resource Strain: Not on file   Food Insecurity: Not on file   Transportation Needs: Not on file   Physical Activity: Not on file   Stress: Not on file   Social Connections: Not on file   Intimate Partner Violence: Not on file   Depression: Not at risk     PHQ-2 Score: 0   Housing Stability: Not on file       Functional Status:  Prior to admission patient needed assistance:       Independent with all ADL's and IADL's       Mental Health Status:        WNL    Chemical Dependency Status:      No issues noted          Values/Beliefs:  Spiritual, Cultural Beliefs, Protestant Practices, Values that affect care:       NO           Additional Information:  This RNCC spoke with patient via phone (COVID +), patient states her plans are to return home, if no issues she will discharge home tomorrow. Patient is aware she was admitted under observation, reviewed MARIO letter, okay for RNCC to sign as patient is COVID +. Patient states she believes they were going to change to inpatient. Patient sates she was told she if no further complications may discharge to home tomorrow. Currently she is not on any oxygen. RNCC will assist with arranging transport when discharge imminent.    RNCC will continue to follow and remain available for any further DC needs.    Martina WELLSN RN CCM  RN Care Coordinator [address] [place] [id number]  [email or web address]   Office: [phone]   Pager: [phone]    To contact Weekend RNCC, dial [phone] and enter job code [phone] at prompt. This pager can not be contacted by text page or outside line.

## 2021-11-07 NOTE — PROGRESS NOTES
See H and P form 746 am     Patient not requiring 02  Tele ; NSR     Check echo   Monitor on tel for now   Labs in am   Follow urine cx , currently on no antibiotics

## 2021-11-07 NOTE — PLAN OF CARE
"Pt up independently in room. Denies pain. No SOB. Saturation mid 90's on RA. Tolerating regular diet. Troponin levels up and down. Tele NSR. Voiding good amounts. No burning with urination since abx started yesterday evening. Pt reports some loose stools following IV abx - probiotic started. Pt mostly just wanting to sleep as she states \"I feel like I have insomnia\". Able to make needs known.   "

## 2021-11-07 NOTE — PLAN OF CARE
"  Pt arrived to unit @0200 vis EMS from [place] ED.     VS:   BP (!) 146/70 (BP Location: Right arm)   Pulse 65   Temp 98.9  F (37.2  C) (Oral)   Resp 18   Ht 1.575 m (5' 2\")   Wt 68 kg (150 lb)   SpO2 96%   BMI 27.44 kg/m      Tele - NSR  LS Clear. Room Air. Denies CP and SOB.    Output:   Voiding adequately up to bathroom  LBM 11/7 - loose per pt report     Activity:   Independent     Skin: Intact   Pain:   denies   Neuro/CMS:   A&Ox4. Denies N/T   Dressing(s):   none   Diet:   regular   LDA:   Lt AC - PIV - SL   Plan:   Monitor troponin and respiratory status     Additional Info:   Special percautions        "

## 2021-11-07 NOTE — ED NOTES
Arranged transport for [place] [facility] Report called to Zuri RUBIO. Estimated time for  is 1hr. Patient stable, A0x3

## 2021-11-07 NOTE — H&P
"[facility]    History and Physical - Hospitalist Service       Date of Admission:  11/6/2021    Assessment & Plan      Russell Rao is a 71 year old F with PMH of HLD, GERD, arthritis, migraines, who presented with symptoms of fever, malaise, poor PO and nausea for 6 days, found to have +COVID19      # Confirmed COVID-19 infection    :: thus far remains on room air and without evidence of renal/CV/CNS involvement  :: c/w remdesavir for now, consider addition of dexamethasone if hypoxic   Symptom Onset Date used to determine duration of isolation.  If unsure, enter \"unknown\"   Date of 1st Positive Test 11-6-21   Vaccination Status Fully Vaccinated       - Admit to medical floor with continuous pulse ox, COVID-19 special precautions  - Oxygen: continue current support with nasal cannula at bedside to keep sats btw 90-95% using 2-8 L/min; titrate to keep SpO2 between 90-96%  - Labs: standard COVID admission labs ordered (CBC with diff, CMP, retic count, LDH, troponin, BNP, CK, INR/PT, PTT, D-dimer, fibrinogen, antithrombin, ferritin, CRP, IL-6)   - Imaging: no additional imaging needed at this time  - Breathing treatments: no inhalers needed; avoid nebulizers in favor of MDIs   - IV fluids: ordered at a rate of 125 mL/hr; hydrate cautiously to avoid worsening respiratory status with volume overload.  - Antibiotics: not indicated   - COVID-Focused Medications: Remdesivir x 5 days or until hospital discharge, started on 11-7-21  - DVT Prophylaxis: at high risk of thrombotic complications due to COVID-19 (DDimer = N/A ).          - PROPHYLACTIC dosing: lovenox 40mg daily        - consider anticoag on discharge for 30 days & until return to normal mobility    #Abnormally elevated troponin  :: Suspect related to above illness, clinically does not appear to have myocarditis pericarditis; slight bump in troponin could potentially be consistent with early stage; more likely " "troponin leak related to age and demand  :: continue monitoring until troponin down trended  :: Monitor closely    #Leukpenia: likely  due to above COVID, monitor    #Hematuria  #Pyuria  :: repeat UA pending sample,  Patient is a former smoker, if fails to resolve recommend outpatient follow up with PCP         Diet: Combination Diet Regular Diet Adult    DVT Prophylaxis: Enoxaparin (Lovenox) SQ  Trevizo Catheter: Not present  Central Lines: None  Code Status: Full Code      Clinically Significant Risk Factors Present on Admission          # Hypocalcemia: Ca = 8.4 mg/dL (Ref range: 8.5 - 10.1 mg/dL) and/or iCa = N/A on admission, will replace as needed          Disposition Plan   Expected discharge:  prior living arrangement once adequate pain management/ tolerating PO medications, antibiotic plan established, mental status at baseline and O2 use less than 2 liters/minute.     The patient's care was discussed with the Bedside Nurse and Patient.    Lloyd Allison MD  [facility]  Securely message with the Vocera Web Console (learn more here)  Text page via [facility] Paging/Directory        ______________________________________________________________________    Chief Complaint   fever    History is obtained from the patient    History of Present Illness   Russell Rao is a 71 year old F with PMH of HLD, GERD, arthritis, migraines, who presented with symptoms of fever, malaise, poor PO and nausea for 6 days, found to have +COVID19    Symptoms started on Tuesday, general fatigue and body aches, later fever  Gradually over the week have worsening paroxysmal as of fatigue, continued fevers and chills, overall malaise; worsened nausea, worsening p.o. intake    Has had COVID vaccinations  No specific aches or pains, no specific joint pains; is \"everywhere\"  No sick contacts  Denies cough, sore throat, congestion, change in taste or smell, chest pain, shortness of breath, lower " extremity edema, rash or skin lesions      Review of Systems    The 10 point Review of Systems is negative other than noted in the HPI or here.   Skin: rash, bruising negative  Eyes: visual blurring, eye pain negative  Ears/Nose/Throat: nasal congestion, hearing loss, epistaxis, dental problem negative  Respiratory: No shortness of breath, dyspnea on exertion, cough, or hemoptysis negative  Cardiovascular: palpitations, chest pain, exertional chest pain or pressure, dyspnea on exertion, orthopnea and lower extremity edema negative  Gastrointestinal: no dysphagia, +nausea, no vomiting, no melena, hematochezia and jaundice negative  Genitourinary: dysuria and hematuria negative  Musculoskeletal: no new joint pain and ++new myalgia   Neurologic: local weakness, numbness, tingling negative  Psychiatric: hallucinations, excessive alcohol consumption and illegal drug usage negative  Hematologic/Lymphatic/Immunologic: ++chills, fever; night sweats negative  Endocrine: polydipsia, polyuria and ++change in appetite--decreased      Past Medical History    I have reviewed this patient's medical history and updated it with pertinent information if needed.   Past Medical History:   Diagnosis Date     Allergic rhinitis      Arthritis      Bell's palsy      Gastroesophageal reflux disease      HLD (hyperlipidemia)      Migraine headaches 1/2/2009     Other and unspecified hyperlipidemia      PONV (postoperative nausea and vomiting)      Vertigo        Past Surgical History   I have reviewed this patient's surgical history and updated it with pertinent information if needed.  Past Surgical History:   Procedure Laterality Date     BLEPHAROPLASTY BILATERAL Bilateral 2/21/2019    Procedure: Bilateral Upper Eyelid Blepharoplasty;  Surgeon: Kezia Davis MD;  Location: [facility] OR     BLEPHAROPLASTY BILATERAL Bilateral 6/18/2020    Procedure: Bilateral upper eyelid blepharoplasty, bilateral pretrichial brow lift;  Surgeon: Susan  MD Kezia;  Location: [facility] OR     HYSTERECTOMY, PAP NO LONGER INDICATED      TVH and prolift repair, ovaries remain     TUBAL LIGATION         Social History   I have reviewed this patient's social history and updated it with pertinent information if needed.  Social History     Tobacco Use     Smoking status: Former Smoker     Types: Cigarettes     Quit date: 2006     Years since quitting: 15.8     Smokeless tobacco: Never Used   Substance Use Topics     Alcohol use: No     Drug use: No       Family History   I have reviewed this patient's family history and updated it with pertinent information if needed.  Family History   Problem Relation Age of Onset     Breast Cancer Maternal Grandmother      Asthma Mother      Osteoporosis Mother      Hypertension Father      Cancer - colorectal Father      Alcohol/Drug Father      Alzheimer Disease Father         dementia     Hypertension Brother      Cerebrovascular Disease Brother              Alcohol/Drug Brother      Lipids Brother      Genitourinary Problems Sister         kidney stones     Gynecology Sister         hyst     Cerebrovascular Disease Brother        Prior to Admission Medications   Prior to Admission Medications   Prescriptions Last Dose Informant Patient Reported? Taking?   Cyanocobalamin (VITAMIN B 12 PO) 2021 at am Self Yes Yes   HYDROcodone-acetaminophen (NORCO) 5-325 MG tablet Not Taking at Unknown time Self No No   Sig: Take 1-2 tablets by mouth every 4 hours as needed for moderate to severe pain   Patient not taking: Reported on 2021   Ibuprofen 200 MG capsule  at prn Self Yes Yes   Sig: Take 200 mg by mouth every 4 hours as needed.   LORATADINE 10 MG OR TABS 2021 at pm Self No Yes   Sig: ONE DAILY   Omega-3 Fatty Acids (FISH OIL PO) 2021 at am Self Yes Yes   erythromycin (ROMYCIN) 5 MG/GM ophthalmic ointment Not Taking at Unknown time Self No No   Sig: Place 0.5 inches into both eyes 2 times daily Apply to both upper eyelid  incision with a qtip   Patient not taking: Reported on 11/6/2021   meclizine (ANTIVERT) 25 MG tablet Not Taking at Unknown time Self Yes No   Sig: Take 12.5 mg by mouth   Patient not taking: Reported on 11/6/2021   multivitamin w/minerals (MULTI-VITAMIN) tablet 11/5/2021 at am Self Yes Yes   Sig: Take 1 tablet by mouth daily   ondansetron (ZOFRAN-ODT) 4 MG ODT tab Not Taking at Unknown time Self No No   Sig: Take 1-2 tablets (4-8 mg) by mouth every 8 hours as needed for nausea   Patient not taking: Reported on 11/6/2021   simvastatin (ZOCOR) 20 MG tablet 11/5/2021 at pm Self No Yes   Sig: Take 1 tablet by mouth At Bedtime.      Facility-Administered Medications: None     Allergies   Allergies   Allergen Reactions     Anesthetics, Halogenated [Halogenated Anesthetics] Nausea and Vomiting     Pollen Extract Itching     Other reaction(s): Unknown     Pollen [Pollen Extract] Itching       Physical Exam   Vital Signs: Temp: 98.9  F (37.2  C) Temp src: Oral BP: (!) 146/70 Pulse: 65   Resp: 18 SpO2: 96 % O2 Device: None (Room air)    Weight: 150 lbs 0 oz    EXAM  General: tired appearing woman lying up in bed, NAD  Head: NC, AT  Eye: symm gaze, anicteric sclerae  ENT: patent nares wo drainage/epistaxis, MMM  Pulm: CTAB, moving adequate air, comfortable WOB on room air  CV: normal rate, regular rhythm  GI: soft, NTND  Neuro: awake, alert, hearing speech and phonation, intact grossly       Data   Data reviewed today: I reviewed all medications, new labs and imaging results over the last 24 hours. I personally reviewed the chest CT image(s) showing no acute pulm findings, suspected ractive LNs, splenic granulomata.    Recent Labs   Lab 11/07/21  0135 11/07/21  0004 11/06/21  1709 11/06/21  1335   WBC  --   --   --  3.0*   HGB  --   --   --  13.4   MCV  --   --   --  89   PLT  --   --   --  156   NA  --   --   --  138   POTASSIUM  --   --   --  3.6   CHLORIDE  --   --   --  106   CO2  --   --   --  24   BUN  --   --   --  12    CR  --   --   --  0.44*   ANIONGAP  --   --   --  8   KLEVER  --   --   --  8.4*   GLC  --   --   --  83   ALBUMIN  --   --   --  3.4   PROTTOTAL  --   --   --  7.1   BILITOTAL  --   --   --  0.5   ALKPHOS  --   --   --  58   ALT  --   --   --  26   AST  --   --   --  39   TROPONIN 0.093* 0.096* 0.063* 0.061*

## 2021-11-07 NOTE — UTILIZATION REVIEW
Admission Status; Secondary Review Determination       Under the authority of the Utilization Management Committee, the utilization review process indicated a secondary review on the above patient. The review outcome is based on review of the medical records, discussions with staff, and applying clinical experience noted on the date of the review.     (x) Inpatient Status Appropriate - This patient's medical care is consistent with medical management for inpatient care and reasonable inpatient medical practice.     RATIONALE FOR DETERMINATION   71 year old F with PMH of HLD, GERD, arthritis, migraines, who presented with symptoms of fever, malaise, poor PO and nausea for 6 days, found to have +COVID19  Discussed with the attending physician, multiple concerning finding requiring inpatient hospital care including elevated troponin concerning for demand ischemia or myocarditis, hypoxia receiving IV remdesivir, higher risk for clinical deterioration  The expected length of stay at the time of admission was more than 2 nights because of the severity of illness, intensity of service provided, and risk for adverse outcome. Inpatient admission is appropriate.     This document was produced using voice recognition software       The information on this document is developed by the utilization review team in order for the business office to ensure compliance. This only denotes the appropriateness of proper admission status and does not reflect the quality of care rendered.   The definitions of Inpatient Status and Observation Status used in making the determination above are those provided in the CMS Coverage Manual, Chapter 1 and Chapter 6, section 70.4.   Sincerely,   DEVIN REILLY MD   System Medical Director   Utilization Management   [facility].

## 2021-11-08 ENCOUNTER — ANESTHESIA EVENT (OUTPATIENT)
Dept: MEDSURG UNIT | Facility: CLINIC | Age: 71
DRG: 179 | End: 2021-11-08
Payer: COMMERCIAL

## 2021-11-08 ENCOUNTER — ANESTHESIA (OUTPATIENT)
Dept: MEDSURG UNIT | Facility: CLINIC | Age: 71
DRG: 179 | End: 2021-11-08
Payer: COMMERCIAL

## 2021-11-08 ENCOUNTER — APPOINTMENT (OUTPATIENT)
Dept: CARDIOLOGY | Facility: CLINIC | Age: 71
DRG: 179 | End: 2021-11-08
Attending: INTERNAL MEDICINE
Payer: COMMERCIAL

## 2021-11-08 LAB
ANION GAP SERPL CALCULATED.3IONS-SCNC: 5 MMOL/L (ref 3–14)
ATRIAL RATE - MUSE: 67 BPM
BUN SERPL-MCNC: 11 MG/DL (ref 7–30)
CALCIUM SERPL-MCNC: 7.8 MG/DL (ref 8.5–10.1)
CHLORIDE BLD-SCNC: 112 MMOL/L (ref 94–109)
CO2 SERPL-SCNC: 24 MMOL/L (ref 20–32)
CREAT SERPL-MCNC: 0.53 MG/DL (ref 0.52–1.04)
CRP SERPL-MCNC: 4.8 MG/L (ref 0–8)
D DIMER PPP FEU-MCNC: 0.55 UG/ML FEU (ref 0–0.5)
DIASTOLIC BLOOD PRESSURE - MUSE: NORMAL MMHG
ERYTHROCYTE [DISTWIDTH] IN BLOOD BY AUTOMATED COUNT: 12 % (ref 10–15)
FIBRINOGEN PPP-MCNC: 343 MG/DL (ref 170–490)
GFR SERPL CREATININE-BSD FRML MDRD: >90 ML/MIN/1.73M2
GLUCOSE BLD-MCNC: 87 MG/DL (ref 70–99)
HCT VFR BLD AUTO: 35 % (ref 35–47)
HGB BLD-MCNC: 11.8 G/DL (ref 11.7–15.7)
INTERPRETATION ECG - MUSE: NORMAL
LVEF ECHO: NORMAL
MCH RBC QN AUTO: 29.6 PG (ref 26.5–33)
MCHC RBC AUTO-ENTMCNC: 33.7 G/DL (ref 31.5–36.5)
MCV RBC AUTO: 88 FL (ref 78–100)
P AXIS - MUSE: 61 DEGREES
PLATELET # BLD AUTO: 128 10E3/UL (ref 150–450)
POTASSIUM BLD-SCNC: 3.1 MMOL/L (ref 3.4–5.3)
POTASSIUM BLD-SCNC: 3.5 MMOL/L (ref 3.4–5.3)
PR INTERVAL - MUSE: 148 MS
QRS DURATION - MUSE: 82 MS
QT - MUSE: 422 MS
QTC - MUSE: 445 MS
R AXIS - MUSE: 34 DEGREES
RBC # BLD AUTO: 3.99 10E6/UL (ref 3.8–5.2)
SODIUM SERPL-SCNC: 141 MMOL/L (ref 133–144)
SYSTOLIC BLOOD PRESSURE - MUSE: NORMAL MMHG
T AXIS - MUSE: 48 DEGREES
TROPONIN I SERPL-MCNC: 0.1 UG/L (ref 0–0.04)
VENTRICULAR RATE- MUSE: 67 BPM
WBC # BLD AUTO: 2.8 10E3/UL (ref 4–11)

## 2021-11-08 PROCEDURE — 93306 TTE W/DOPPLER COMPLETE: CPT | Mod: 26 | Performed by: INTERNAL MEDICINE

## 2021-11-08 PROCEDURE — 93005 ELECTROCARDIOGRAM TRACING: CPT

## 2021-11-08 PROCEDURE — 86140 C-REACTIVE PROTEIN: CPT | Performed by: PEDIATRICS

## 2021-11-08 PROCEDURE — 36415 COLL VENOUS BLD VENIPUNCTURE: CPT | Performed by: PEDIATRICS

## 2021-11-08 PROCEDURE — 84484 ASSAY OF TROPONIN QUANT: CPT | Performed by: PEDIATRICS

## 2021-11-08 PROCEDURE — 250N000011 HC RX IP 250 OP 636: Performed by: PEDIATRICS

## 2021-11-08 PROCEDURE — 255N000002 HC RX 255 OP 636: Performed by: INTERNAL MEDICINE

## 2021-11-08 PROCEDURE — 85027 COMPLETE CBC AUTOMATED: CPT | Performed by: PEDIATRICS

## 2021-11-08 PROCEDURE — 258N000003 HC RX IP 258 OP 636: Performed by: INTERNAL MEDICINE

## 2021-11-08 PROCEDURE — 80048 BASIC METABOLIC PNL TOTAL CA: CPT | Performed by: PEDIATRICS

## 2021-11-08 PROCEDURE — 85384 FIBRINOGEN ACTIVITY: CPT | Performed by: PEDIATRICS

## 2021-11-08 PROCEDURE — 370N000003 HC ANESTHESIA WARD SERVICE

## 2021-11-08 PROCEDURE — 250N000013 HC RX MED GY IP 250 OP 250 PS 637: Performed by: INTERNAL MEDICINE

## 2021-11-08 PROCEDURE — 120N000002 HC R&B MED SURG/OB UMMC

## 2021-11-08 PROCEDURE — 84132 ASSAY OF SERUM POTASSIUM: CPT | Performed by: PEDIATRICS

## 2021-11-08 PROCEDURE — 99232 SBSQ HOSP IP/OBS MODERATE 35: CPT | Performed by: INTERNAL MEDICINE

## 2021-11-08 PROCEDURE — 999N000208 ECHOCARDIOGRAM COMPLETE

## 2021-11-08 PROCEDURE — 85379 FIBRIN DEGRADATION QUANT: CPT | Performed by: PEDIATRICS

## 2021-11-08 PROCEDURE — 250N000011 HC RX IP 250 OP 636: Performed by: INTERNAL MEDICINE

## 2021-11-08 PROCEDURE — 93010 ELECTROCARDIOGRAM REPORT: CPT | Performed by: INTERNAL MEDICINE

## 2021-11-08 PROCEDURE — XW033G6 INTRODUCTION OF REGN-COV2 MONOCLONAL ANTIBODY INTO PERIPHERAL VEIN, PERCUTANEOUS APPROACH, NEW TECHNOLOGY GROUP 6: ICD-10-PCS | Performed by: INTERNAL MEDICINE

## 2021-11-08 RX ORDER — POTASSIUM CHLORIDE 750 MG/1
40 TABLET, EXTENDED RELEASE ORAL ONCE
Status: COMPLETED | OUTPATIENT
Start: 2021-11-08 | End: 2021-11-08

## 2021-11-08 RX ORDER — ACETAMINOPHEN 325 MG/1
650 TABLET ORAL EVERY 4 HOURS PRN
Status: DISCONTINUED | OUTPATIENT
Start: 2021-11-08 | End: 2021-11-10 | Stop reason: HOSPADM

## 2021-11-08 RX ADMIN — ENOXAPARIN SODIUM 40 MG: 40 INJECTION SUBCUTANEOUS at 08:20

## 2021-11-08 RX ADMIN — SIMVASTATIN 20 MG: 10 TABLET, FILM COATED ORAL at 22:22

## 2021-11-08 RX ADMIN — Medication 1 CAPSULE: at 19:49

## 2021-11-08 RX ADMIN — HUMAN ALBUMIN MICROSPHERES AND PERFLUTREN 5 ML: 10; .22 INJECTION, SOLUTION INTRAVENOUS at 14:48

## 2021-11-08 RX ADMIN — POTASSIUM CHLORIDE 40 MEQ: 750 TABLET, EXTENDED RELEASE ORAL at 10:22

## 2021-11-08 RX ADMIN — Medication 1 CAPSULE: at 12:31

## 2021-11-08 RX ADMIN — CASIRIVIMAB AND IMDEVIMAB: 600; 600 INJECTION, SOLUTION, CONCENTRATE INTRAVENOUS at 18:06

## 2021-11-08 RX ADMIN — ACETAMINOPHEN 650 MG: 325 TABLET, FILM COATED ORAL at 10:22

## 2021-11-08 ASSESSMENT — ACTIVITIES OF DAILY LIVING (ADL)
ADLS_ACUITY_SCORE: 8

## 2021-11-08 NOTE — ANESTHESIA CARE TRANSFER NOTE
Patient: Russell Rao    Procedure: * No procedures listed *       Diagnosis: * No pre-op diagnosis entered *  Diagnosis Additional Information: No value filed.    Anesthesia Type:   No value filed.     Note:                      Comments: Anesthesia CRNA Procedure    Patient:Russell Rao ([id number])  Site: [place]  Procedure: iv start  Diagnosis:covid  Surgeon/Doctor:   CRNA: SEBASTIAN Baker CRNA, CRNA 11/8/2021 5:52 AM        Vitals:  Vitals Value Taken Time   BP     Temp     Pulse     Resp     SpO2         Electronically Signed By: SEBASTIAN Baker CRNA  November 8, 2021  5:52 AM

## 2021-11-08 NOTE — PROGRESS NOTES
Nishant Munoz, Pharmacy Student  11/8/2021 12:30  Antimicrobial Stewardship Team Note    Antimicrobial Stewardship Program - A joint venture between [facility] and  Physicians to optimize antibiotic management.  NOT a formal consult - Restricted Antimicrobial Review     Patient: Russell Rao  MRN: [id number]  Allergies: Anesthetics, halogenated [halogenated anesthetics]; Pollen extract; and Pollen [pollen extract]    Brief Summary: Russell Rao is a 71 year old female with a past medical history significant for HLD, GERD, arthritis, migraines, and history of bladder infections. Patient was admitted to [facility] on 11/6/2021 with 6 days of fatigue, nausea, body aches, and decreased appetite found have COVID-19.     History of Present Illness: Patient also reported increased urinary frequency for 3 weeks and right flank pain without dysuria or hematuria. Patient stated being vaccinated against COVID-19, no records on [facility].  Patient's O2 saturations have remained > 94% on room air. Initial vitals on arrival included /70, HR 65, Temp 98.9, RR 18. Labs on arrival included Scr 0.44, WBC 3.0,  procalcitonin < 0.05, RP 15, troponin 0.061, and lactic acid of 0.7. Chest xray showed no acute airspace disease and CT chest showed no PE, no focal airspace disease, prominent though nonenlarged mediastinal and hilar lymph nodes, and sequelae of prior granulomatous disease. Remdesivir was initiated 11/6 for COVID-19. Urinalysis on 11/6 showed moderate Leukocyte esterase, WBC 20, and negative nitrites. Ceftriaxone was started 11/7 due to concerns for UTI. Urine culture with < 10,000 CFU/mL urogenital richard. Initial blood cultures have no growth after 1 day.         Active Anti-infective Medications   (From admission, onward)                 Start     Stop    11/07/21 1700  cefTRIAXone  1 g,   Intravenous,   EVERY 24 HOURS        Urinary Tract Infection        --    11/07/21 1600  remdesivir 100 mg IV soln  " 100 mg,   Intravenous,   125-500 mL/hr,   EVERY 24 HOURS        COVID-19       \"And\" Linked Group Details    11/11/21 1703                  Assessment:   COVID-19 pneumonia   Patient presented with symptoms of mild COVID-19 as she remains on RA with O2 saturation > 94%. Chest imaging is not indicative of new infiltrates or consolidation that would be concerning for disease progression. Therefore, remdesivir is not indicated at this time. However, due to advanced age and risk for clinical progression of COVID-19, patient could benefit from monoclonal antibodies (Casirivimab plus imdevimab) if oxygenation remains stable on room air to prevent worsening COVID infection.      Urinary tract infection    Patient presented with prolonged symptoms of urinary frequency and flank pain that are non-specific for UTI especially in the abscess of dysuria and unremarkable UA/UCx. We recommend stopping ceftriaxone given lack of evidence for UTI.     Recommendations:    1.) Discontinue Ceftriaxone     2.) Discontinue Remdesivir    3.) Consider Casirivimab plus imdevimab    Discussed with ID Staff: Jimbo Ness MD, Cecilia Armendariz, PharmD, BCIDP, and Olga Mclain, PharmD  Pager: [phone]    Nishant Munoz, PharmD Candidate      Vital Signs/Clinical Features:  Vitals  Report        11/06 0700 11/07 0659 11/07 0700 11/08 0659 11/08 0700 11/08 1348   Most Recent      Temp ( F) 98.8 -  99.7    97.9 -  98.7      98.1     98.1 (36.7) 11/08 0821    Pulse 61 -  97    62 -  73      71     71 11/08 0821    Resp   18    16 -  18      16     16 11/08 0821    /68 -  146/74    109/58 -  135/64      133/77     133/77 11/08 0821    SpO2 (%) 92 -  96    95 -  96      95     95 11/08 0821            Labs  Estimated Creatinine Clearance: 89.3 mL/min (based on SCr of 0.53 mg/dL).  Recent Labs   Lab Test 11/06/21  1335 11/07/21  0715 11/08/21 0722   CR 0.44* 0.46* 0.53       Recent Labs   Lab Test 11/06/21  1335 11/08/21 0722   WBC 3.0* " 2.8*   HGB 13.4 11.8   HCT 39.1 35.0   MCV 89 88    128*       Recent Labs   Lab Test 11/06/21  1335   BILITOTAL 0.5   ALKPHOS 58   ALBUMIN 3.4   AST 39   ALT 26       Recent Labs   Lab Test 11/06/21  1335 11/08/21  0722   PCAL <0.05  --    LACT 0.7  --    CRP 15.0* 4.8   SED 24  --              Culture Results:  7-Day Micro Results       Procedure Component Value Units Date/Time    Blood Culture Arm, Right [[id number]]  (Normal) Collected: 11/07/21 0715    Order Status: Completed Lab Status: Preliminary result Updated: 11/08/21 1002    Specimen: Blood from Arm, Right      Culture No growth after 1 day    Blood Culture Arm, Right [[id number]]  (Normal) Collected: 11/06/21 1602    Order Status: Completed Lab Status: Preliminary result Updated: 11/07/21 1747    Specimen: Blood from Arm, Right      Culture No growth after 1 day    Urine Culture [[id number]] Collected: 11/06/21 1508    Order Status: Completed Lab Status: Final result Updated: 11/07/21 1623    Specimen: Urine, Midstream      Culture <10,000 CFU/mL Mixture of urogenital richard    Blood Culture Peripheral Blood     Order Status: Canceled Lab Status: No result     Specimen: Peripheral Blood             Recent Labs   Lab Test 11/06/21  1508 11/07/21  0957   URINEPH 6.0 6.0   NITRITE Negative Negative   LEUKEST Moderate* Moderate*   WBCU 20*  --                          Imaging: XR Chest Port 1 View    Result Date: 11/6/2021  Chest one view portable upright HISTORY: Fever hypoxic COMPARISON STUDY: 9/30/2011 Findings: Cardiac silhouette is nonenlarged. Normal left basilar atelectasis.Lungs are otherwise clear.     IMPRESSION: No acute airspace disease JONAH MCKEON MD   SYSTEM ID:  [id number]    CT Chest Pulmonary Embolism w Contrast    Result Date: 11/6/2021  CT CHEST PULMONARY EMBOLISM W CONTRAST, 11/6/2021 4:41 PM History: PE suspected, high prob Comparison: 70 chest x-ray Technique: Helical acquisition of CT images of the chest from the lung  apices to the kidneys were acquired after the administration of intravenous contrast according to the CT pulmonary angiogram protocol. Axial images were reconstructed in 1 and 3 mm slice thickness. Coronal reconstructions performed. Three-dimensional (3D) post-processed angiographic images were reconstructed, archived to PACS and used in the interpretation of this study. Contrast dose: 55ml isovue 370 Findings: Pulmonary artery: The contrast bolus is adequate.  There is no pulmonary embolism. No evidence of right heart strain. Mediastinum: No cardiomegaly or pericardial effusion. Atherosclerotic calcifications of the aortic arch and coronary arteries. Prominent mediastinal and hilar lymph nodes which are not enlarged by size criteria. Pleura: No pneumothorax or pleural effusions. Lungs/Airway: The central airway is patent. Minimal bibasilar atelectasis. No focal airspace opacities. No suspicious pulmonary nodules. Upper abdomen: Calcified splenic granulomas. Cholecystectomy clips. Remainder of the visualized upper abdomen is unremarkable within the limits of this exam. Bones/soft tissues: No acute or suspicious osseous bodies. Soft tissues within normal limits.  No evidence of infection.  No suspicious pulmonary nodules.  No pleural effusion or pneumothorax.     Impression: 1. No pulmonary embolism identified. 2. No focal airspace disease. 3. Prominent though nonenlarged mediastinal and hilar lymph nodes, likely reactive. 4. Sequelae of prior granulomatous disease. I have personally reviewed the examination and initial interpretation and I agree with the findings. SABINA MCELROY MD   SYSTEM ID:  [id number]

## 2021-11-08 NOTE — PLAN OF CARE
"      VS: Blood pressure 133/77, pulse 71, temperature 98.1  F (36.7  C), temperature source Axillary, resp. rate 16, height 1.575 m (5' 2\"), weight 68 kg (150 lb), SpO2 95 %.  On RA.   Output: Continent of bladder and BM, had Bm this shift, voiding without difficulties.     Lungs: Clear, denied SOB and chest pain.   Activity: IND.    Skin: Intact.    Pain:   C/o headache this AM , PRN tylenol offered with some relief.    Neuro/CMS:   A & Ox 4, CMS intact.   Dressing(s):   None.    Diet:   Good appetite on R.T diet, denied nausea.    LDA:   PIV, SL.    Equipment:   Call light,    Plan:   Monitor troponin level, if it is normal, Ok to discharge home.    Additional Info:   K+ replacement completed this shift, recheck K+ this PM,        "

## 2021-11-08 NOTE — PLAN OF CARE
"      VS:   /74 (BP Location: Right arm)   Pulse 73   Temp 98.4  F (36.9  C) (Oral)   Resp 18   Ht 1.575 m (5' 2\")   Wt 68 kg (150 lb)   SpO2 95%   BMI 27.44 kg/m       Output:   Pt voiding spontaneously without diffuclty.  LBM: today- pt has been having diarrhea states that it has been a little better since she ate applesauce. Culturell administered.    Activity:   Independent   Skin: Intact    Pain:   Denies    Neuro/CMS:   A&O x4- denies numbness/ tingling   Dressing(s):   None    Diet:   Regular    LDA:   L PIV infusing 0.9% NaCl @ 125ml/hr   Equipment:   IV pole, call light within reach, personal belongings, telemetry    Plan:   Continue antibiotics and monitor troponin levels   Additional Info:   Elevated troponin levels       "

## 2021-11-08 NOTE — PLAN OF CARE
"      VS: /58 (BP Location: Right arm)   Pulse 62   Temp 97.9  F (36.6  C) (Oral)   Resp 16   Ht 1.575 m (5' 2\")   Wt 68 kg (150 lb)   SpO2 95%   BMI 27.44 kg/m    Tele - NSR   Output: Up to bathroom, voiding spontaneously. LBM: 11/7 - diarrhea after antibiotics, received probiotics   Lungs: Clear bilaterally, O2 sats mid to upper 90s on RA. Denies chest pain or SOB.    Activity: Ind, steady gait.   Skin: WDL   Pain:   Denies   Neuro/CMS:   A & O x4. CMS intact, denies N/T.    Dressing(s):   None   Diet:   Regular    LDA:   L PIV infiltrated  Required 4 IV attempts to get new IV in R lower forearm - infusing NS @ 125 mL/hr   Equipment:   IV pump and pole   Plan:   Monitor troponin and resp. Continue w/ POC.    Additional Info:   0000 trop: 0.099      "

## 2021-11-08 NOTE — PROGRESS NOTES
"Patient doing well   No cp   Some aches and pain   No fever   Spoke with ID and ok to give Casirivimab plus imdevimab for covid related disease   Spokew ith cardiology and discussed         Vital Signs:  Temp: 98.1  F (36.7  C) Temp src: Axillary BP: 133/77 Pulse: 71   Resp: 16 SpO2: 95 % O2 Device: None (Room air)   Height: 157.5 cm (5' 2\") Weight: 68 kg (150 lb)  Estimated body mass index is 27.44 kg/m  as calculated from the following:    Height as of this encounter: 1.575 m (5' 2\").    Weight as of this encounter: 68 kg (150 lb).    Neck ; supple. No JVD . No Thyromegaly  Chest ; clear bilaterally , no rales , or rhonchi   CVS ; RRR, no m/r/g . S1 and S2.   GI ; soft abdomen , non tender , BS positive.   Psych ; appropriate mood and affect .  Neuro ; alert and oriented .No facial asymmetry . No pronator drift ,   Skin ; no rash or purpura seen on exposed area      ROUTINE IP LABS (Last four results)  BMPRecent Labs   Lab 11/08/21  1419 11/08/21  0722 11/07/21  0715 11/06/21  1335   NA  --  141  --  138   POTASSIUM 3.5 3.1*  --  3.6   CHLORIDE  --  112*  --  106   KLEVER  --  7.8*  --  8.4*   CO2  --  24  --  24   BUN  --  11  --  12   CR  --  0.53 0.46* 0.44*   GLC  --  87  --  83     CBC  Recent Labs   Lab 11/08/21  0722 11/06/21  1335   WBC 2.8* 3.0*   RBC 3.99 4.41   HGB 11.8 13.4   HCT 35.0 39.1   MCV 88 89   MCH 29.6 30.4   MCHC 33.7 34.3   RDW 12.0 11.9   * 156     INRNo lab results found in last 7 days.      A/P    Russell Rao is a 71 year old F with PMH of HLD, GERD, arthritis, migraines, who presented with symptoms of fever, malaise, poor PO and nausea for 6 days, found to have +COVID19        # Confirmed COVID-19 infection    :: thus far remains on room air and without evidence of renal/CV/CNS involvement  :: c/w remdesavir for now, consider addition of dexamethasone if hypoxic   Symptom Onset Date used to determine duration of isolation.  If unsure, enter \"unknown\"   Date of 1st Positive " Test 11-6-21   Vaccination Status Fully Vaccinated         - Admit to medical floor with continuous pulse ox, COVID-19 special precautions  - Oxygen: continue current support with nasal cannula at bedside to keep sats btw 90-95% using 2-8 L/min; titrate to keep SpO2 between 90-96%  - Labs: standard [facility] admission labs ordered (CBC with diff, CMP, retic count, LDH, troponin, BNP, CK, INR/PT, PTT, D-dimer, fibrinogen, antithrombin, ferritin, CRP, IL-6)   - Imaging: no additional imaging needed at this time  - Breathing treatments: no inhalers needed; avoid nebulizers in favor of MDIs   - IV fluids: ordered at a rate of 125 mL/hr; hydrate cautiously to avoid worsening respiratory status with volume overload.  - Antibiotics: not indicated   - COVID-Focused Medications: Remdesivir   Spoke with ID , Remdesvir stopped an give Regeneron  - DVT Prophylaxis: at high risk of thrombotic complications due to COVID-19 (DDimer = N/A ).          - PROPHYLACTIC dosing: lovenox 40mg daily        - consider anticoag on discharge for 30 days & until return to normal mobility     #Abnormally elevated troponin  :: Suspect related to above illness, clinically does not appear to have myocarditis pericarditis; slight bump in troponin could potentially be consistent with early stage; more likely troponin leak related to age and demand       #Leukpenia: likely  due to above COVID, monitor     #Hematuria  #Pyuria  :: repeat UA pending sample,  Patient is a former smoker, if fails to resolve recommend outpatient follow up with PCP

## 2021-11-09 LAB
ANION GAP SERPL CALCULATED.3IONS-SCNC: 5 MMOL/L (ref 3–14)
BUN SERPL-MCNC: 8 MG/DL (ref 7–30)
CALCIUM SERPL-MCNC: 8.6 MG/DL (ref 8.5–10.1)
CHLORIDE BLD-SCNC: 106 MMOL/L (ref 94–109)
CO2 SERPL-SCNC: 26 MMOL/L (ref 20–32)
CREAT SERPL-MCNC: 0.48 MG/DL (ref 0.52–1.04)
CRP SERPL-MCNC: 5.8 MG/L (ref 0–8)
D DIMER PPP FEU-MCNC: 0.49 UG/ML FEU (ref 0–0.5)
ERYTHROCYTE [DISTWIDTH] IN BLOOD BY AUTOMATED COUNT: 11.8 % (ref 10–15)
FIBRINOGEN PPP-MCNC: 378 MG/DL (ref 170–490)
GFR SERPL CREATININE-BSD FRML MDRD: >90 ML/MIN/1.73M2
GLUCOSE BLD-MCNC: 176 MG/DL (ref 70–99)
HCT VFR BLD AUTO: 37.8 % (ref 35–47)
HGB BLD-MCNC: 12.8 G/DL (ref 11.7–15.7)
MCH RBC QN AUTO: 29.6 PG (ref 26.5–33)
MCHC RBC AUTO-ENTMCNC: 33.9 G/DL (ref 31.5–36.5)
MCV RBC AUTO: 88 FL (ref 78–100)
PLATELET # BLD AUTO: 152 10E3/UL (ref 150–450)
POTASSIUM BLD-SCNC: 2.9 MMOL/L (ref 3.4–5.3)
POTASSIUM BLD-SCNC: 3.7 MMOL/L (ref 3.4–5.3)
RBC # BLD AUTO: 4.32 10E6/UL (ref 3.8–5.2)
SODIUM SERPL-SCNC: 137 MMOL/L (ref 133–144)
TROPONIN I SERPL-MCNC: 0.1 UG/L (ref 0–0.04)
WBC # BLD AUTO: 2.7 10E3/UL (ref 4–11)

## 2021-11-09 PROCEDURE — 93005 ELECTROCARDIOGRAM TRACING: CPT

## 2021-11-09 PROCEDURE — 36415 COLL VENOUS BLD VENIPUNCTURE: CPT | Performed by: PEDIATRICS

## 2021-11-09 PROCEDURE — 36415 COLL VENOUS BLD VENIPUNCTURE: CPT | Performed by: INTERNAL MEDICINE

## 2021-11-09 PROCEDURE — 85379 FIBRIN DEGRADATION QUANT: CPT | Performed by: PEDIATRICS

## 2021-11-09 PROCEDURE — 85384 FIBRINOGEN ACTIVITY: CPT | Performed by: PEDIATRICS

## 2021-11-09 PROCEDURE — 250N000013 HC RX MED GY IP 250 OP 250 PS 637: Performed by: INTERNAL MEDICINE

## 2021-11-09 PROCEDURE — 80048 BASIC METABOLIC PNL TOTAL CA: CPT | Performed by: PEDIATRICS

## 2021-11-09 PROCEDURE — 250N000011 HC RX IP 250 OP 636: Performed by: PEDIATRICS

## 2021-11-09 PROCEDURE — 85027 COMPLETE CBC AUTOMATED: CPT | Performed by: PEDIATRICS

## 2021-11-09 PROCEDURE — 84132 ASSAY OF SERUM POTASSIUM: CPT | Performed by: INTERNAL MEDICINE

## 2021-11-09 PROCEDURE — 99231 SBSQ HOSP IP/OBS SF/LOW 25: CPT | Performed by: INTERNAL MEDICINE

## 2021-11-09 PROCEDURE — 86140 C-REACTIVE PROTEIN: CPT | Performed by: PEDIATRICS

## 2021-11-09 PROCEDURE — 120N000002 HC R&B MED SURG/OB UMMC

## 2021-11-09 PROCEDURE — 84484 ASSAY OF TROPONIN QUANT: CPT | Performed by: INTERNAL MEDICINE

## 2021-11-09 RX ORDER — POTASSIUM CHLORIDE 750 MG/1
40 TABLET, EXTENDED RELEASE ORAL ONCE
Status: COMPLETED | OUTPATIENT
Start: 2021-11-09 | End: 2021-11-09

## 2021-11-09 RX ORDER — POTASSIUM CHLORIDE 750 MG/1
20 TABLET, EXTENDED RELEASE ORAL ONCE
Status: COMPLETED | OUTPATIENT
Start: 2021-11-09 | End: 2021-11-09

## 2021-11-09 RX ADMIN — Medication 1 CAPSULE: at 19:49

## 2021-11-09 RX ADMIN — RIVAROXABAN 10 MG: 10 TABLET, FILM COATED ORAL at 17:31

## 2021-11-09 RX ADMIN — ACETAMINOPHEN 650 MG: 325 TABLET, FILM COATED ORAL at 08:33

## 2021-11-09 RX ADMIN — SIMVASTATIN 20 MG: 10 TABLET, FILM COATED ORAL at 22:10

## 2021-11-09 RX ADMIN — ACETAMINOPHEN 650 MG: 325 TABLET, FILM COATED ORAL at 19:49

## 2021-11-09 RX ADMIN — Medication 1 CAPSULE: at 08:33

## 2021-11-09 RX ADMIN — ENOXAPARIN SODIUM 40 MG: 40 INJECTION SUBCUTANEOUS at 08:33

## 2021-11-09 RX ADMIN — POTASSIUM CHLORIDE 40 MEQ: 750 TABLET, EXTENDED RELEASE ORAL at 11:17

## 2021-11-09 RX ADMIN — POTASSIUM CHLORIDE 20 MEQ: 750 TABLET, EXTENDED RELEASE ORAL at 13:04

## 2021-11-09 ASSESSMENT — ACTIVITIES OF DAILY LIVING (ADL)
CONCENTRATING,_REMEMBERING_OR_MAKING_DECISIONS_DIFFICULTY: NO
WALKING_OR_CLIMBING_STAIRS_DIFFICULTY: NO
ADLS_ACUITY_SCORE: 8
TOILETING_ISSUES: NO
ADLS_ACUITY_SCORE: 4
DRESSING/BATHING_DIFFICULTY: NO
DIFFICULTY_COMMUNICATING: NO
ADLS_ACUITY_SCORE: 4
HEARING_DIFFICULTY_OR_DEAF: NO
ADLS_ACUITY_SCORE: 4
ADLS_ACUITY_SCORE: 8
ADLS_ACUITY_SCORE: 4
WEAR_GLASSES_OR_BLIND: YES
ADLS_ACUITY_SCORE: 4
FALL_HISTORY_WITHIN_LAST_SIX_MONTHS: NO
ADLS_ACUITY_SCORE: 4
PATIENT_/_FAMILY_COMMUNICATION_STYLE: SPOKEN LANGUAGE (ENGLISH OR BILINGUAL)
ADLS_ACUITY_SCORE: 4
ADLS_ACUITY_SCORE: 8
VISION_MANAGEMENT: GLASSES
DIFFICULTY_EATING/SWALLOWING: NO
ADLS_ACUITY_SCORE: 4
DOING_ERRANDS_INDEPENDENTLY_DIFFICULTY: NO
ADLS_ACUITY_SCORE: 8
ADLS_ACUITY_SCORE: 8

## 2021-11-09 NOTE — PLAN OF CARE
A&O x 4 VSS, LS clear and on room air without issue. Neuros & CMS intact. No complaints of pain. BS + LBM yesterday. Voiding independently. Tolerating a regular diet without issue. Denies CP or SOB. Potassium slightly low today so replaced orally; plan for recheck at 1700. Up independently in her room.

## 2021-11-09 NOTE — PROGRESS NOTES
Care Management Updated Note    Length of Stay (days): 3    Expected Discharge Date: 11/09/2021     Concerns to be Addressed:     DC dispo  Patient plan of care discussed at interdisciplinary rounds: Yes    Anticipated Discharge Disposition:  Home no needs     Anticipated Discharge Services: none   Anticipated Discharge DME:  none      Additional Information:  Received call from Rosalind STEPHENS with [employer], [phone] requesting update on discharge and updated phone number for patient as they want to set her up with a Care Coordinator    This RNCC spoke with patient, DC planned for 11/10- still watching Potassium. Will need ride thru [employer] set up for DC tomorrow. Patient sates best number for her to be reached at is her cell .    Martina WELLSN RN CCM  RN Care Coordinator [address] [place] [address]  [email or web address]   Office: [phone]) [phone]   Pager: [phone]    To contact Weekend RNCC, dial [phone] and enter job code [phone] at prompt. This pager can not be contacted by text page or outside line.

## 2021-11-09 NOTE — PROGRESS NOTES
"    11/9    No new issues reported per patient or nursing staff   Feels better   Potasium low this am       Vital Signs:  Temp: 98.3  F (36.8  C) Temp src: Oral BP: 135/69 Pulse: 83   Resp: 16 SpO2: 98 % O2 Device: None (Room air)   Height: 157.5 cm (5' 2\") Weight: 68 kg (150 lb)  Estimated body mass index is 27.44 kg/m  as calculated from the following:    Height as of this encounter: 1.575 m (5' 2\").    Weight as of this encounter: 68 kg (150 lb).    Neck ; supple. No JVD . No Thyromegaly  Chest ; clear bilaterally , no rales , or rhonchi   CVS ; RRR, no m/r/g . S1 and S2.   GI ; soft abdomen , non tender , BS positive.   Psych ; appropriate mood and affect .  Neuro ; alert and oriented .No facial asymmetry . No pronator drift ,   Skin ; no rash or purpura seen on exposed area      ROUTINE IP LABS (Last four results)  BMPRecent Labs   Lab 11/09/21  0914 11/08/21  1419 11/08/21  0722 11/07/21  0715 11/06/21  1335     --  141  --  138   POTASSIUM 2.9* 3.5 3.1*  --  3.6   CHLORIDE 106  --  112*  --  106   KLEVER 8.6  --  7.8*  --  8.4*   CO2 26  --  24  --  24   BUN 8  --  11  --  12   CR 0.48*  --  0.53 0.46* 0.44*   *  --  87  --  83     CBC  Recent Labs   Lab 11/09/21  0914 11/08/21  0722 11/06/21  1335   WBC 2.7* 2.8* 3.0*   RBC 4.32 3.99 4.41   HGB 12.8 11.8 13.4   HCT 37.8 35.0 39.1   MCV 88 88 89   MCH 29.6 29.6 30.4   MCHC 33.9 33.7 34.3   RDW 11.8 12.0 11.9    128* 156     INRNo lab results found in last 7 days.      ASSESSMENT AND PLAN;      Russell Rao is a 71 year old F with PMH of HLD, GERD, arthritis, migraines, who presented with symptoms of fever, malaise, poor PO and nausea for 6 days, found to have +COVID19        # Confirmed COVID-19 infection    :: thus far remains on room air and without evidence of renal/CV/CNS involvement  :: c/w remdesavir for now, consider addition of dexamethasone if hypoxic   Symptom Onset Date used to determine duration of isolation.  If unsure, " "enter \"unknown\"   Date of 1st Positive Test 11-6-21   Vaccination Status Fully Vaccinated         - Admit to medical floor with continuous pulse ox, COVID-19 special precautions  - Oxygen: continue current support with nasal cannula at bedside to keep sats btw 90-95% using 2-8 L/min; titrate to keep SpO2 between 90-96%     - Imaging: no additional imaging needed at this time  - Breathing treatments: no inhalers needed; avoid nebulizers in favor of MDIs     - Antibiotics: not indicated   - COVID-Focused Medications:   Spoke with ID , Remdesvir stopped an give Regeneron  - DVT Prophylaxis: at high risk of thrombotic complications due to COVID-19 (DDimer = N/A ).          - PROPHYLACTIC dosing: lovenox 40mg daily        - consider anticoag on discharge for 30 days & until return to normal mobility     #Abnormally elevated troponin  :: Suspect related to above illness, clinically does not appear to have myocarditis pericarditis; slight bump in troponin could potentially be consistent with early stage; more likely troponin leak related to age and demand  Spoke with cardiology and discussed case   No further work up required         #Leukpenia: likely  due to above COVID, monitor     # dispo ; likely discontinue to am         "

## 2021-11-09 NOTE — DISCHARGE SUMMARY
Service Date: 11/10/2021    DISCHARGE DIAGNOSES:  1.  COVID-19 infection.  2.  Elevated troponin though to be due to viral etiology  3.  Leukopenia.  4.  Hypokalemia.    BRIEF HOSPITAL COURSE:  The patient is a 71-year-old lady with past medical history of hyperlipidemia, GERD, arthritis, and migraines, who presented with symptoms of fever, malaise and poor p.o. intake along with nausea to the ED.  She was found to have COVID-19 infection.  Date of first positive test was 2021.  The patient has been fully vaccinated against COVID-19.  She did not require any oxygen.  She was given remdesivir, but dexamethasone was not started, as she was not hypoxic.  After consultation with ID, this was stopped and she was given a one-time dose of Regeneron.  The patient tolerated that medication without complication.    She was continued on Lovenox 40 mg during hospitalization and discharged on Rivaroxaban    For her abnormal troponin, Cardiology was consulted over the phone.  Echocardiogram was done.  EF was normal.  At this time, this was attributed likely to the viral infection and no further workup was advised. She had no chest pain and EKG did not reveal any acute change    PHYSICAL EXAMINATION ON DISCHARGE:  GENERAL:  The patient is alert, oriented, no acute distress, feels well.  VITAL SIGNS:  Blood pressure is 133/75, temperature is 98.1, pulse is 73, respiratory rate is 16, pulse ox is 96% on room air.  HEENT:  Atraumatic, normocephalic.  Pupils equal, round, react to light.  EOMI.  Mouth:  Mucosa moist.  NECK:  Supple.  CHEST:  Clear.  CARDIAC:  Regular rate and rhythm.  ABDOMEN:  Soft, nontender.  Bowel sounds are positive.  EXTREMITIES:  No edema.    Over 32 minutes were spent discharging this patient.    Jannette Finney MD        D: 2021   T: 2021   MT: [id number]    Name:     JERI GONSALEZ  MRN:      [id number]        Account:      [id number]   :      1950           Service Date:  11/10/2021       Document: [id number]

## 2021-11-09 NOTE — PLAN OF CARE
"VS: /70 (BP Location: Left arm, Patient Position: Sitting)   Pulse 78   Temp 97.9  F (36.6  C) (Oral)   Resp 16   Ht 1.575 m (5' 2\")   Wt 68 kg (150 lb)   SpO2 95%   BMI 27.44 kg/m    Cont pulse ox  Tele - NSR   Output: Up to bathroom, voiding spontaneously. LBM: 11/8 - soft/loose stools. Receiving probiotics   Lungs: Clear bilaterally, O2 sats mid to upper 90s on RA. Denies chest pain or SOB.    Activity: Ind, steady gait.   Skin: WDL   Pain:    Denies   Neuro/CMS:    A & O x4. CMS intact, denies N/T.    Dressing(s):    None   Diet:    Regular    LDA:    R PIV - SL, flushing well   Equipment:    IV pump and pole   Plan:    Monitor troponin and resp. Continue w/ POC.    Additional Info:    RN managed K+ protocol.      "

## 2021-11-09 NOTE — PLAN OF CARE
"  VS:   /77 (BP Location: Right arm, Patient Position: Sitting)   Pulse 71   Temp 98.1  F (36.7  C) (Axillary)   Resp 16   Ht 1.575 m (5' 2\")   Wt 68 kg (150 lb)   SpO2 95%   BMI 27.44 kg/m        Output: LBM 11/8  Voiding without difficulties   Lungs: clear   Activity: Independent    Skin: intact   Pain:   Denies    Neuro/CMS:   A&Ox4 CMS intact    Dressing(s):   None    Diet:   Regular    LDA:   PIV SL   Equipment:   Iv pole    Plan:   Monitor troponin then discharge when that is stable  K+ recheck    Additional Info:   Received Regen COV infusion around 6 pm 1x      "

## 2021-11-10 VITALS
DIASTOLIC BLOOD PRESSURE: 75 MMHG | HEIGHT: 62 IN | HEART RATE: 74 BPM | TEMPERATURE: 97.6 F | BODY MASS INDEX: 27.6 KG/M2 | SYSTOLIC BLOOD PRESSURE: 125 MMHG | RESPIRATION RATE: 18 BRPM | OXYGEN SATURATION: 93 % | WEIGHT: 150 LBS

## 2021-11-10 LAB
ANION GAP SERPL CALCULATED.3IONS-SCNC: 2 MMOL/L (ref 3–14)
BUN SERPL-MCNC: 9 MG/DL (ref 7–30)
CALCIUM SERPL-MCNC: 8.4 MG/DL (ref 8.5–10.1)
CHLORIDE BLD-SCNC: 110 MMOL/L (ref 94–109)
CO2 SERPL-SCNC: 26 MMOL/L (ref 20–32)
CREAT SERPL-MCNC: 0.43 MG/DL (ref 0.52–1.04)
CRP SERPL-MCNC: 3.3 MG/L (ref 0–8)
D DIMER PPP FEU-MCNC: 0.43 UG/ML FEU (ref 0–0.5)
ERYTHROCYTE [DISTWIDTH] IN BLOOD BY AUTOMATED COUNT: 11.9 % (ref 10–15)
FIBRINOGEN PPP-MCNC: 354 MG/DL (ref 170–490)
GFR SERPL CREATININE-BSD FRML MDRD: >90 ML/MIN/1.73M2
GLUCOSE BLD-MCNC: 106 MG/DL (ref 70–99)
HCT VFR BLD AUTO: 35.6 % (ref 35–47)
HGB BLD-MCNC: 12.2 G/DL (ref 11.7–15.7)
MCH RBC QN AUTO: 29.8 PG (ref 26.5–33)
MCHC RBC AUTO-ENTMCNC: 34.3 G/DL (ref 31.5–36.5)
MCV RBC AUTO: 87 FL (ref 78–100)
PLATELET # BLD AUTO: 156 10E3/UL (ref 150–450)
POTASSIUM BLD-SCNC: 3.8 MMOL/L (ref 3.4–5.3)
RBC # BLD AUTO: 4.1 10E6/UL (ref 3.8–5.2)
SODIUM SERPL-SCNC: 138 MMOL/L (ref 133–144)
WBC # BLD AUTO: 2.3 10E3/UL (ref 4–11)

## 2021-11-10 PROCEDURE — 86140 C-REACTIVE PROTEIN: CPT | Performed by: PEDIATRICS

## 2021-11-10 PROCEDURE — 80048 BASIC METABOLIC PNL TOTAL CA: CPT | Performed by: PEDIATRICS

## 2021-11-10 PROCEDURE — 85027 COMPLETE CBC AUTOMATED: CPT | Performed by: PEDIATRICS

## 2021-11-10 PROCEDURE — 36415 COLL VENOUS BLD VENIPUNCTURE: CPT | Performed by: PEDIATRICS

## 2021-11-10 PROCEDURE — 85379 FIBRIN DEGRADATION QUANT: CPT | Performed by: PEDIATRICS

## 2021-11-10 PROCEDURE — 99239 HOSP IP/OBS DSCHRG MGMT >30: CPT | Performed by: INTERNAL MEDICINE

## 2021-11-10 PROCEDURE — 85384 FIBRINOGEN ACTIVITY: CPT | Performed by: PEDIATRICS

## 2021-11-10 PROCEDURE — 93010 ELECTROCARDIOGRAM REPORT: CPT | Performed by: INTERNAL MEDICINE

## 2021-11-10 PROCEDURE — 250N000013 HC RX MED GY IP 250 OP 250 PS 637: Performed by: INTERNAL MEDICINE

## 2021-11-10 RX ADMIN — ACETAMINOPHEN 650 MG: 325 TABLET, FILM COATED ORAL at 07:29

## 2021-11-10 RX ADMIN — Medication 1 CAPSULE: at 07:35

## 2021-11-10 ASSESSMENT — ACTIVITIES OF DAILY LIVING (ADL)
ADLS_ACUITY_SCORE: 4

## 2021-11-10 NOTE — PLAN OF CARE
VSS, on RA, So2>90%, denied SOB, lung sounds clear.  Alert and O x4,   C/o lower back pain due the bed is hard for her. PRN tylenol offered with some relief.   Continent of B/B, had couple normal BM today per patient.   IND.   Had EKG this shift.   K+ 3.7 after replacement.  R PIV, SL.   Plan to go home tomorrow.

## 2021-11-10 NOTE — PLAN OF CARE
"  VS:    BP (!) 145/81 (BP Location: Left arm)   Pulse 74   Temp 98.6  F (37  C) (Oral)   Resp 16   Ht 1.575 m (5' 2\")   Wt 68 kg (150 lb)   SpO2 96%   BMI 27.44 kg/m       Output:    Continent of bowel and bladder, LBM 11/9   Activity:     Ind in room   Skin: Intact   Pain:    Denied at night   Neuro/CMS:    A&Ox4, CMS intact   Dressing(s):     N/A   Diet:    Regular   Equipment:     Call light within reach   IV's/Drains:    N/A   Plan:    Plan to discharge today   Additional Info:                 "

## 2021-11-10 NOTE — PLAN OF CARE
"/75 (BP Location: Left arm)   Pulse 74   Temp 97.6  F (36.4  C) (Oral)   Resp 18   Ht 1.575 m (5' 2\")   Wt 68 kg (150 lb)   SpO2 93%   BMI 27.44 kg/m      Pt. discharged at 1230 to previous living arrangement-independent living facility, was accompanied by [facility] , and left with personal belongings. Pt. received complete discharge paperwork and medications as filled by discharge pharmacy. Pt. was given times of last dose for all discharge medications in writing on discharge medication sheets; Xarelto. Discharge teaching included blood thinner medication, pain management with tylenol, COVID precautions/management, and signs and symptoms of worsening viral infection. Pt to purchase fingertip pulse oximeter prob to monitor O2 levels at home; advised to return to hospital if dropping below 92% Incentive spirometer initial instructions provided. Pt demonstrated proper use and understanding of device. Tolerating well without side SOB or cough at 1000. Per consult with cardiology post echo; elevated troponin and most likely mild myocarditis due to viral infection. Pt to follow up with primary care in 1-2 weeks. Pt. had no further questions at the time of discharge and no unmet needs were identified.   "

## 2021-11-11 ENCOUNTER — PATIENT OUTREACH (OUTPATIENT)
Dept: FAMILY MEDICINE | Facility: CLINIC | Age: 71
End: 2021-11-11
Payer: COMMERCIAL

## 2021-11-11 LAB
ATRIAL RATE - MUSE: 73 BPM
BACTERIA BLD CULT: NO GROWTH
DIASTOLIC BLOOD PRESSURE - MUSE: NORMAL MMHG
INTERPRETATION ECG - MUSE: NORMAL
P AXIS - MUSE: 79 DEGREES
PR INTERVAL - MUSE: 156 MS
QRS DURATION - MUSE: 80 MS
QT - MUSE: 398 MS
QTC - MUSE: 438 MS
R AXIS - MUSE: 26 DEGREES
SYSTOLIC BLOOD PRESSURE - MUSE: NORMAL MMHG
T AXIS - MUSE: 41 DEGREES
VENTRICULAR RATE- MUSE: 73 BPM

## 2021-11-11 NOTE — TELEPHONE ENCOUNTER
Patient has not been established at [facility] since 2012. Sees PCP through [facility]. No outreach made.     Luci Gonzalez, RN, BSN, PHN  [facility]: [place]

## 2021-11-12 LAB
ATRIAL RATE - MUSE: 69 BPM
ATRIAL RATE - MUSE: 69 BPM
BACTERIA BLD CULT: NO GROWTH
DIASTOLIC BLOOD PRESSURE - MUSE: NORMAL MMHG
DIASTOLIC BLOOD PRESSURE - MUSE: NORMAL MMHG
INTERPRETATION ECG - MUSE: NORMAL
INTERPRETATION ECG - MUSE: NORMAL
P AXIS - MUSE: 57 DEGREES
P AXIS - MUSE: 57 DEGREES
PR INTERVAL - MUSE: 152 MS
PR INTERVAL - MUSE: 152 MS
QRS DURATION - MUSE: 78 MS
QRS DURATION - MUSE: 78 MS
QT - MUSE: 404 MS
QT - MUSE: 404 MS
QTC - MUSE: 432 MS
QTC - MUSE: 432 MS
R AXIS - MUSE: 20 DEGREES
R AXIS - MUSE: 20 DEGREES
SYSTOLIC BLOOD PRESSURE - MUSE: NORMAL MMHG
SYSTOLIC BLOOD PRESSURE - MUSE: NORMAL MMHG
T AXIS - MUSE: 35 DEGREES
T AXIS - MUSE: 35 DEGREES
VENTRICULAR RATE- MUSE: 69 BPM
VENTRICULAR RATE- MUSE: 69 BPM

## 2022-04-09 ENCOUNTER — HEALTH MAINTENANCE LETTER (OUTPATIENT)
Age: 72
End: 2022-04-09

## 2022-06-04 ENCOUNTER — HEALTH MAINTENANCE LETTER (OUTPATIENT)
Age: 72
End: 2022-06-04

## 2022-10-09 ENCOUNTER — HEALTH MAINTENANCE LETTER (OUTPATIENT)
Age: 72
End: 2022-10-09

## 2022-12-13 ENCOUNTER — MEDICAL CORRESPONDENCE (OUTPATIENT)
Dept: HEALTH INFORMATION MANAGEMENT | Facility: CLINIC | Age: 72
End: 2022-12-13

## 2023-03-31 ENCOUNTER — TRANSCRIBE ORDERS (OUTPATIENT)
Dept: OTHER | Age: 73
End: 2023-03-31

## 2023-03-31 ENCOUNTER — TELEPHONE (OUTPATIENT)
Dept: GASTROENTEROLOGY | Facility: CLINIC | Age: 73
End: 2023-03-31
Payer: COMMERCIAL

## 2023-03-31 DIAGNOSIS — Z12.11 SCREENING FOR MALIGNANT NEOPLASM OF COLON: Primary | ICD-10-CM

## 2023-03-31 NOTE — TELEPHONE ENCOUNTER
Screening Questions  BLUE  KIND OF PREP RED  LOCATION [review exclusion criteria] GREEN  SEDATION TYPE        Y Are you active on mychart?       Hallie Vance PA-C Ordering/Referring Provider?        MEDICA What type of coverage do you have?      N Have you had a positive covid test in the last 14 days?     27.4 1. BMI  [BMI 40+ - review exclusion criteria]    Y  2. Are you able to give consent for your medical care? [IF NO,RN REVIEW]          N  3. Are you taking any prescription pain medications on a routine schedule   (ex narcotics: oxycodone, roxicodone, oxycontin,  and percocet)? [RN Review]        NA  3a. EXTENDED PREP What kind of prescription?     N 4. Do you have any chemical dependencies such as alcohol, street drugs, or methadone?        **If yes 3- 5 , please schedule with MAC sedation.**          IF YES TO ANY 6 - 10 - HOSPITAL SETTING ONLY.     N 6.   Do you need assistance transferring?     N 7.   Have you had a heart or lung transplant?    N 8.   Are you currently on dialysis?   N 9.   Do you use daily home oxygen?   N 10. Do you take nitroglycerin?   10a. NA If yes, how often?     11. [FEMALES]  N Are you currently pregnant?    11a. NA If yes, how many weeks? [ Greater than 12 weeks, OR NEEDED]    N 12. Do you have Pulmonary Hypertension? *NEED PAC APPT AT [facility] w/ MAC*     N 13. [review exclusion criteria]  Do you have any implantable devices in your body (pacemaker, defib, LVAD)?    N 14. In the past 6 months, have you had any heart related issues including cardiomyopathy or heart attack?     14a. NA If yes, did it require cardiac stenting if so when?     N 15. Have you had a stroke or Transient ischemic attack (TIA - aka  mini stroke ) within 6 months?      N 16. Do you have mod to severe Obstructive Sleep Apnea?  [Hospital only]    N 17. Do you have SEVERE AND UNCONTROLLED asthma? *NEED PAC APPT AT [facility] w/MAC*     18. Are you currently taking any blood thinners?     18a. No. Continue  "to 19.   18b. Yes/no Blood Thinner: No [CONTINUE TO #19]    N 19. Do you take the medication Phentermine?    19a. If yes, \"Hold for 7 days before procedure.  Please consult your prescribing provider if you have questions about holding this medication.\"     N  20. Do you have chronic kidney disease?      N  21. Do you have a diagnosis of diabetes?     N  22. On a regular basis do you go 3-5 days between bowel movements?      23. Preferred LOCAL Pharmacy for Pre Prescription    [ LIST ONLY ONE PHARMACY]         [id number] - [place] [address] AT [facility] [address]        - [facility] REMINDERS -    Informed patient they will need an adult    Cannot take any type of public or medical transportation alone    Conscious Sedation- Needs  for 6 hours after the procedure       MAC/General-Needs  for 24 hours after procedure    Pre-Procedure Covid test to be completed [ESSC PCR Testing Required]    Confirmed Nurse will call to complete assessment       - SCHEDULING DETAILS -  N Hospital Setting Required? If yes, what is the exclusion?: DONOVAN CONLEY  Surgeon    6/8  Date of Procedure  Lower Endoscopy [Colonoscopy]  Type of Procedure Scheduled  [facility] Location   MIRALAX GATORADE WITHOUT MAGNEISUM CITRATE Which Colonoscopy Prep was Sent?     MODERATE Sedation Type     N PAC / Pre-op Required                 "

## 2023-05-23 ENCOUNTER — TELEPHONE (OUTPATIENT)
Dept: GASTROENTEROLOGY | Facility: CLINIC | Age: 73
End: 2023-05-23

## 2023-05-23 NOTE — TELEPHONE ENCOUNTER
Attempted to contact patient regarding upcoming Colonoscopy  procedure on 6/8/2023 for pre assessment questions. No answer.     Left message to return call to [phone] #[phone]    Gely Lai RN  Endoscopy Procedure Pre Assessment RN

## 2023-05-23 NOTE — TELEPHONE ENCOUNTER
Pre assessment questions completed for upcoming Colonoscopy  procedure scheduled on 06.08.2023    COVID policy reviewed.     Reviewed procedural arrival time 1230 and facility location [facility]; [address] [place] [address], [address] Floor, [place] [address]    Designated  policy reviewed. Instructed to have someone stay 6 hours post procedure.     NSAIDs? Yes.  Ibuprofen (Advil, Motrin).  Holding interval of 1 day before procedure. and Naproxen (Naprosyn, Aleve).  Holding interval of 4 days.    Anticoagulation/blood thinners? No-Patient reports no longer taking Xarelto    Electronic implanted devices? No    Diabetic? No     Patient reports not taking Norco    Patient wanted to do Golytely prep d/t wanting a prescription. Sent new instructions and prescription to pharmacy on file.     Reviewed procedure prep instructions.     Patient verbalized understanding and had no questions or concerns at this time.    Brianda Villalta RN  Endoscopy Procedure Pre Assessment RN

## 2023-05-23 NOTE — TELEPHONE ENCOUNTER
Patient scheduled for Colonoscopy  on 6/8/2023.     Discuss Covid policy.     Pre op exam needed? N/A    Arrival time: 1230. Procedure time 1330    [facility]; [address] [place] [address], 5[address] Floor, [place] [address]    Sedation type: Conscious sedation     NSAIDs? verify    Anticoagulations? Yes Rivaroxaban (Xarelto). Holding interval of 2 days    Electronic implanted devices? No    Diabetic? No    Indication for procedure: Screening for malignant neoplasm of colon, hx of polyps    Bowel prep recommendation: Miralax prep without magnesium citrate. May need Extended Golytely if still taking Norco. Please send script to pharm and instructions via Cognitum if this is the case. Otherwise Miralax with gatorade    Prep instructions sent via edulio     Bowel prep script sent to    [id number] - [place], [place] - [address] [facility] [address]    Pre visit planning completed.    Gely Lai RN  Endoscopy Procedure Pre Assessment RN

## 2023-05-27 ENCOUNTER — HEALTH MAINTENANCE LETTER (OUTPATIENT)
Age: 73
End: 2023-05-27

## 2023-06-08 ENCOUNTER — HOSPITAL ENCOUNTER (OUTPATIENT)
Facility: AMBULATORY SURGERY CENTER | Age: 73
Discharge: HOME | End: 2023-06-08
Attending: STUDENT IN AN ORGANIZED HEALTH CARE EDUCATION/TRAINING PROGRAM | Admitting: STUDENT IN AN ORGANIZED HEALTH CARE EDUCATION/TRAINING PROGRAM
Payer: COMMERCIAL

## 2023-06-08 VITALS
OXYGEN SATURATION: 99 % | HEIGHT: 62 IN | HEART RATE: 67 BPM | DIASTOLIC BLOOD PRESSURE: 75 MMHG | BODY MASS INDEX: 28.16 KG/M2 | SYSTOLIC BLOOD PRESSURE: 141 MMHG | TEMPERATURE: 96.9 F | RESPIRATION RATE: 16 BRPM | WEIGHT: 153 LBS

## 2023-06-08 DIAGNOSIS — K59.00 CONSTIPATION, UNSPECIFIED CONSTIPATION TYPE: Primary | ICD-10-CM

## 2023-06-08 LAB — COLONOSCOPY: NORMAL

## 2023-06-08 PROCEDURE — 88305 TISSUE EXAM BY PATHOLOGIST: CPT | Mod: TC | Performed by: STUDENT IN AN ORGANIZED HEALTH CARE EDUCATION/TRAINING PROGRAM

## 2023-06-08 PROCEDURE — 45380 COLONOSCOPY AND BIOPSY: CPT | Mod: 59,PT

## 2023-06-08 PROCEDURE — 45385 COLONOSCOPY W/LESION REMOVAL: CPT | Mod: PT

## 2023-06-08 PROCEDURE — 88305 TISSUE EXAM BY PATHOLOGIST: CPT | Mod: 26 | Performed by: PATHOLOGY

## 2023-06-08 RX ORDER — FENTANYL CITRATE 50 UG/ML
INJECTION, SOLUTION INTRAMUSCULAR; INTRAVENOUS DAILY PRN
Status: DISCONTINUED | OUTPATIENT
Start: 2023-06-08 | End: 2023-06-08 | Stop reason: HOSPADM

## 2023-06-08 RX ORDER — ACETAMINOPHEN 500 MG
500-1000 TABLET ORAL EVERY 6 HOURS PRN
COMMUNITY

## 2023-06-08 RX ORDER — ONDANSETRON 2 MG/ML
4 INJECTION INTRAMUSCULAR; INTRAVENOUS EVERY 6 HOURS PRN
Status: DISCONTINUED | OUTPATIENT
Start: 2023-06-08 | End: 2023-06-09 | Stop reason: HOSPADM

## 2023-06-08 RX ORDER — NALOXONE HYDROCHLORIDE 0.4 MG/ML
0.4 INJECTION, SOLUTION INTRAMUSCULAR; INTRAVENOUS; SUBCUTANEOUS
Status: DISCONTINUED | OUTPATIENT
Start: 2023-06-08 | End: 2023-06-09 | Stop reason: HOSPADM

## 2023-06-08 RX ORDER — PROCHLORPERAZINE MALEATE 5 MG
5 TABLET ORAL EVERY 6 HOURS PRN
Status: DISCONTINUED | OUTPATIENT
Start: 2023-06-08 | End: 2023-06-09 | Stop reason: HOSPADM

## 2023-06-08 RX ORDER — NALOXONE HYDROCHLORIDE 0.4 MG/ML
0.2 INJECTION, SOLUTION INTRAMUSCULAR; INTRAVENOUS; SUBCUTANEOUS
Status: DISCONTINUED | OUTPATIENT
Start: 2023-06-08 | End: 2023-06-09 | Stop reason: HOSPADM

## 2023-06-08 RX ORDER — ONDANSETRON 4 MG/1
4 TABLET, ORALLY DISINTEGRATING ORAL EVERY 6 HOURS PRN
Status: DISCONTINUED | OUTPATIENT
Start: 2023-06-08 | End: 2023-06-09 | Stop reason: HOSPADM

## 2023-06-08 RX ORDER — FLUMAZENIL 0.1 MG/ML
0.2 INJECTION, SOLUTION INTRAVENOUS
Status: DISCONTINUED | OUTPATIENT
Start: 2023-06-08 | End: 2023-06-09 | Stop reason: HOSPADM

## 2023-06-08 RX ORDER — ONDANSETRON 2 MG/ML
4 INJECTION INTRAMUSCULAR; INTRAVENOUS
Status: DISCONTINUED | OUTPATIENT
Start: 2023-06-08 | End: 2023-06-08 | Stop reason: HOSPADM

## 2023-06-08 RX ORDER — LIDOCAINE 40 MG/G
CREAM TOPICAL
Status: DISCONTINUED | OUTPATIENT
Start: 2023-06-08 | End: 2023-06-08 | Stop reason: HOSPADM

## 2023-06-08 NOTE — LETTER
June 9, 2023      Zachtony BAEZ Jalen  [address]   [facility] [address]        Dear ,    We are writing to inform you of your test results.    All of the polyps removed during your recent colonoscopy was found to be adenomas - these are considered to be precancerous polyps.  Please note there was NO cancer in the polyp.     Given the finding of this type of polyp, you are at higher risk for growing precancerous polyps in the future. I recommend that you undergo a repeat colonoscopy in 3 years. However, a sooner examination might be necessary if you start developing any symptoms such as rectal bleeding, change in bowel habits, anemia, etc.  Please discuss this with your primary physician at the time of your next office appointment.  Your primary physician will schedule this repeat colonoscopy at the appropriate time.    Please share this information with your family members so they can discuss with their primary physician their need for colorectal cancer screening.      It has been a pleasure to participate in your care.  Please call our clinic if you have any questions or concerns.        Resulted Orders   Surgical Pathology Exam   Result Value Ref Range    Case Report       Surgical Pathology Report                         Case: [id number]                                  Authorizing Provider:  Gely Carter MD          Collected:           06/08/2023 01:58 PM          Ordering Location:     [facility] OR  Received:            06/08/2023 03:06 PM                                 [place]                                                                  Pathologist:           Yonathan Rene DO                                                            Specimens:   A) - Other, Descending Colon Polyp x3                                                               B) - Other, Cecal Polyp                                                                             C) - Other, Ascending  "Colon Polyp                                                          Final Diagnosis       A.  COLON, ASCENDING, POLYPS:  - Tubular adenoma  - No high-grade dysplasia or malignancy identified  - Sessile serrated adenoma  - No cytologic dysplasia identified    B.  COLON, CECUM, POLYP:  - Tubular adenoma  - No high-grade dysplasia or malignancy identified    C.  COLON, ASCENDING, POLYP:  - Tubular adenoma  - No high-grade dysplasia or malignancy identified        Clinical Information       Screening for malignant neoplasm of colon      Gross Description       A(1). Other, Descending Colon Polyp x3:  The specimen is received in formalin with proper patient identification, labeled \"Descending colon polyp x3\".  The specimen consists of 3 tan-white soft tissue fragments ranging from 0.3 to 1.1 cm in greatest dimension.  Submitted in A1.   B(2). Other, Cecal Polyp:  The specimen is received in formalin with proper patient identification, labeled \"cecal polyp\".  The specimen consists of a 0.9 cm tan-white soft tissue fragment received with an additional 5 tan-white soft tissue fragment measuring 0.1 cm in greatest dimension.  The largest piece is submitted in B1 and the remainder of the specimen is submitted in B2.   C(3). Other, Ascending Colon Polyp:  The specimen is received in formalin with proper patient identification, labeled \"ascending colon polyp\".  The specimen consists of 3 tan-pink soft tissue fragments ranging from 0.2 to 0.5 cm in greatest dimension.  Submitted in C1.       Microscopic Description       Microscopic examination was performed.        Performing Labs       The technical component of this testing was completed at [facility]        Case Images         If you have any questions or concerns, please call the clinic at the number listed above.       Sincerely,      Gely Carter MD            "

## 2023-06-08 NOTE — H&P
Russell Rao  [id number]  female  72 year old      Reason for procedure/surgery: surveillance colonoscopy  Dad with CRC in his 70s  1 small TA     Patient Active Problem List   Diagnosis     Constipation     Migraine headache     Allergic state     S/P hysterectomy     Hyperlipidemia LDL goal <130     Advanced directives, counseling/discussion     Migraine     Obesity     Bell's palsy     Epigastric pain     Eyelid cancer     Major depression, single episode     Symptomatic cholelithiasis     Oral phase dysphagia     Dysarthria     Hemifacial spasm     Visual field constriction, bilateral     Acquired involutional ptosis of eyelid, bilateral     Visual field defect     Spastic torticollis     Blepharospasm of right eye     Spasmodic torticollis      Infection due to 2019 novel coronavirus       Past Surgical History:    Past Surgical History:   Procedure Laterality Date     BLEPHAROPLASTY BILATERAL Bilateral 2019    Procedure: Bilateral Upper Eyelid Blepharoplasty;  Surgeon: Kezia Davis MD;  Location: [facility] OR     BLEPHAROPLASTY BILATERAL Bilateral 2020    Procedure: Bilateral upper eyelid blepharoplasty, bilateral pretrichial brow lift;  Surgeon: Kezia Davis MD;  Location: [facility] OR     HYSTERECTOMY, PAP NO LONGER INDICATED      TVH and prolift repair, ovaries remain     TUBAL LIGATION         Past Medical History:   Past Medical History:   Diagnosis Date     Allergic rhinitis      Arthritis      Bell's palsy      Gastroesophageal reflux disease      HLD (hyperlipidemia)      Migraine headaches 2009     Other and unspecified hyperlipidemia      PONV (postoperative nausea and vomiting)      Vertigo        Social History:   Social History     Tobacco Use     Smoking status: Former     Types: Cigarettes     Quit date: 2006     Years since quittin.3     Smokeless tobacco: Never   Vaping Use     Vaping status: Not on file   Substance Use Topics     Alcohol use: No       Family  History:   Family History   Problem Relation Age of Onset     Breast Cancer Maternal Grandmother      Asthma Mother      Osteoporosis Mother      Hypertension Father      Cancer - colorectal Father      Alcohol/Drug Father      Alzheimer Disease Father         dementia     Hypertension Brother      Cerebrovascular Disease Brother              Alcohol/Drug Brother      Lipids Brother      Genitourinary Problems Sister         kidney stones     Gynecology Sister         hyst     Cerebrovascular Disease Brother        Allergies:   Allergies   Allergen Reactions     Anesthetics, Halogenated [Anesthetics, Halogenated] Nausea and Vomiting     Pollen Extract Itching     Other reaction(s): Unknown     Pollen [Pollen Extract] Itching       Active Medications:   Current Outpatient Medications   Medication Sig Dispense Refill     Cyanocobalamin (VITAMIN B 12 PO)        erythromycin (ROMYCIN) 5 MG/GM ophthalmic ointment Place 0.5 inches into both eyes 2 times daily Apply to both upper eyelid incision with a qtip (Patient not taking: Reported on 2021) 3.5 g 0     HYDROcodone-acetaminophen (NORCO) 5-325 MG tablet Take 1-2 tablets by mouth every 4 hours as needed for moderate to severe pain (Patient not taking: Reported on 2021) 20 tablet 0     LORATADINE 10 MG OR TABS ONE DAILY 90 Tab 3     meclizine (ANTIVERT) 25 MG tablet Take 12.5 mg by mouth (Patient not taking: Reported on 2021)       multivitamin w/minerals (MULTI-VITAMIN) tablet Take 1 tablet by mouth daily       Omega-3 Fatty Acids (FISH OIL PO)        ondansetron (ZOFRAN-ODT) 4 MG ODT tab Take 1-2 tablets (4-8 mg) by mouth every 8 hours as needed for nausea (Patient not taking: Reported on 2021) 4 tablet 0     rivaroxaban ANTICOAGULANT (XARELTO) 10 MG TABS tablet Take 1 tablet (10 mg) by mouth daily (with dinner) 27 tablet 0     simvastatin (ZOCOR) 20 MG tablet Take 1 tablet by mouth At Bedtime. 90 tablet 3       Systemic Review:   CONSTITUTIONAL:  "NEGATIVE for fever, chills, change in weight  ENT/MOUTH: NEGATIVE for ear, mouth and throat problems  RESP: NEGATIVE for significant cough or SOB  CV: NEGATIVE for chest pain, palpitations or peripheral edema    Physical Examination:   Vital Signs: /74 (BP Location: Right arm)   Pulse 77   Temp 97  F (36.1  C) (Temporal)   Resp 18   Ht 1.562 m (5' 1.5\")   Wt 69.4 kg (153 lb)   SpO2 95%   BMI 28.44 kg/m    GENERAL: healthy, alert and no distress  NECK: no adenopathy, no asymmetry, masses, or scars  RESP: lungs clear to auscultation - no rales, rhonchi or wheezes  CV: regular rate and rhythm, normal S1 S2, no S3 or S4, no murmur, click or rub, no peripheral edema and peripheral pulses strong  ABDOMEN: soft, nontender, no hepatosplenomegaly, no masses and bowel sounds normal  MS: no gross musculoskeletal defects noted, no edema      Plan: Appropriate to proceed as scheduled.      Gely Carter MD  6/8/2023    PCP:  Hallie Vance    "

## 2023-06-09 LAB
PATH REPORT.COMMENTS IMP SPEC: NORMAL
PATH REPORT.COMMENTS IMP SPEC: NORMAL
PATH REPORT.FINAL DX SPEC: NORMAL
PATH REPORT.GROSS SPEC: NORMAL
PATH REPORT.MICROSCOPIC SPEC OTHER STN: NORMAL
PATH REPORT.RELEVANT HX SPEC: NORMAL
PHOTO IMAGE: NORMAL

## 2023-06-10 ENCOUNTER — HEALTH MAINTENANCE LETTER (OUTPATIENT)
Age: 73
End: 2023-06-10

## 2024-08-03 ENCOUNTER — HEALTH MAINTENANCE LETTER (OUTPATIENT)
Age: 74
End: 2024-08-03

## 2024-09-26 ENCOUNTER — TRANSCRIBE ORDERS (OUTPATIENT)
Dept: OTHER | Age: 74
End: 2024-09-26

## 2024-09-26 DIAGNOSIS — T83.711D EROSION OF IMPLANTED VAGINAL MESH AND PROSTHETIC MATERIALS, SUBSEQUENT ENCOUNTER: Primary | ICD-10-CM

## 2024-10-10 ENCOUNTER — VIRTUAL VISIT (OUTPATIENT)
Dept: UROLOGY | Facility: CLINIC | Age: 74
End: 2024-10-10
Attending: OBSTETRICS & GYNECOLOGY
Payer: COMMERCIAL

## 2024-10-10 DIAGNOSIS — T83.711D EROSION OF IMPLANTED VAGINAL MESH AND PROSTHETIC MATERIALS, SUBSEQUENT ENCOUNTER: ICD-10-CM

## 2024-10-10 PROCEDURE — 99203 OFFICE O/P NEW LOW 30 MIN: CPT | Mod: 95 | Performed by: UROLOGY

## 2024-10-10 NOTE — PROGRESS NOTES
Russell is a 74 year old who is being evaluated via a billable video visit.    How would you like to obtain your AVS? MyChart  If the video visit is dropped, the invitation should be resent by: Text to cell phone: [phone]  Will anyone else be joining your video visit? No          Subjective   Russell is a 74 year old, presenting for the following health issues:  Consult For (mesh)    Video Start Time:     HPI     Patient is a 74 y.o. female who was requested to be seen by Dr. Collier for vaginal mesh extrusion.  She is s/p pelvic floor prolapse repair in 2007.  She was seen by Dr. Bailon for vaginal mesh extrusion in 2020.  She c/o vaginal bleeding recently.  She was prescribed estrogen cream.  She c/o intermittent bleeding only.         Review of Systems  Constitutional, neuro, ENT, endocrine, pulmonary, cardiac, gastrointestinal, genitourinary, musculoskeletal, integument and psychiatric systems are negative, except as otherwise noted.      Objective           Vitals:  No vitals were obtained today due to virtual visit.    Physical Exam   GENERAL: alert and no distress  EYES: Eyes grossly normal to inspection.  No discharge or erythema, or obvious scleral/conjunctival abnormalities.  RESP: No audible wheeze, cough, or visible cyanosis.    SKIN: Visible skin clear. No significant rash, abnormal pigmentation or lesions.  NEURO: Cranial nerves grossly intact.  Mentation and speech appropriate for age.  PSYCH: Appropriate affect, tone, and pace of words    Vaginal mesh extrusion:  discussed observation vs surgical excision.  She will call me back if she wants it excise.  Will need to examine patient in clinic prior to surgery it she wants to proceed with it.       Video-Visit Details    Type of service:  Video Visit   Video End Time:  Originating Location (pt. Location): Home    Distant Location (provider location):  Off-site  Platform used for Video Visit: Miguelina  Signed Electronically by: Lorenzo Marshall MD

## 2025-08-16 ENCOUNTER — HEALTH MAINTENANCE LETTER (OUTPATIENT)
Age: 75
End: 2025-08-16

## (undated) DEVICE — NDL 30GA 0.5" 305106

## (undated) DEVICE — BLADE KNIFE SURG 15 371115

## (undated) DEVICE — LINEN TOWEL PACK X5 5464

## (undated) DEVICE — PACK MINOR EYE CUSTOM ASC

## (undated) DEVICE — SYR 03ML LL W/O NDL 309657

## (undated) DEVICE — SU PLAIN 6-0 G-1DA 18" 770G

## (undated) DEVICE — SU PDS II 3-0 SH 27" Z316H

## (undated) DEVICE — SPECIMEN CONTAINER 3OZ W/FORMALIN 59901

## (undated) DEVICE — SU PLAIN FAST ABSORB 5-0 PC-1 18" 1915G

## (undated) DEVICE — SOL WATER IRRIG 500ML BOTTLE 2F7113

## (undated) DEVICE — SU ETHILON 5-0 P-3 18" BLACK 698G

## (undated) DEVICE — TUBING SUCTION MEDI-VAC 1/4"X20' N620A

## (undated) DEVICE — EYE PREP BETADINE 5% SOLUTION 30ML 0065-0411-30

## (undated) DEVICE — NDL 25GA 1.5" 305127

## (undated) DEVICE — PEN MARKING SKIN TYCO DEVON DUAL TIP 31145868

## (undated) DEVICE — ESU EYE HIGH TEMP 65410-183

## (undated) DEVICE — SU PLAIN FAST ABSORB 6-0 PC-1 18" 1916G

## (undated) DEVICE — SU ETHILON 10-0 TG-160-4DA 12" 7707G

## (undated) DEVICE — KIT ENDO TURNOVER/PROCEDURE CARRY-ON 101822

## (undated) DEVICE — GLOVE PROTEXIS MICRO 7.0  2D73PM70

## (undated) DEVICE — GOWN IMPERVIOUS 2XL BLUE

## (undated) DEVICE — GLOVE PROTEXIS BLUE W/NEU-THERA 7.5  2D73EB75

## (undated) DEVICE — DRSG KERLIX 4 1/2"X4YDS ROLL 6730

## (undated) DEVICE — SU MONOCRYL 4-0 PS-2 18" UND Y496G

## (undated) DEVICE — SUCTION MANIFOLD NEPTUNE 2 SYS 1 PORT 702-025-000

## (undated) DEVICE — STPL SKIN 35W ROTATING HEAD PRW35

## (undated) DEVICE — ENDO FORCEP SPIKED SERRATED SHAFT JUMBO 239CM G56998

## (undated) DEVICE — ESU NDL COLORADO MICRO 3CM STR N103A

## (undated) DEVICE — ENDO SNARE EXACTO COLD 9MM LOOP 2.4MMX230CM 00711115

## (undated) DEVICE — SNARE CAPIVATOR ROUND COLD SNR BX10 M00561101

## (undated) DEVICE — TUBING SUCTION 12"X1/4" N612

## (undated) DEVICE — SPONGE RAY-TEC 4X8" 7318

## (undated) RX ORDER — PROPOFOL 10 MG/ML
INJECTION, EMULSION INTRAVENOUS
Status: DISPENSED
Start: 2020-06-18

## (undated) RX ORDER — SIMETHICONE 40MG/0.6ML
SUSPENSION, DROPS(FINAL DOSAGE FORM)(ML) ORAL
Status: DISPENSED
Start: 2020-08-12

## (undated) RX ORDER — ERYTHROMYCIN 5 MG/G
OINTMENT OPHTHALMIC
Status: DISPENSED
Start: 2019-02-21

## (undated) RX ORDER — BUPIVACAINE HYDROCHLORIDE 2.5 MG/ML
INJECTION, SOLUTION EPIDURAL; INFILTRATION; INTRACAUDAL
Status: DISPENSED
Start: 2020-06-18

## (undated) RX ORDER — LIDOCAINE HYDROCHLORIDE 20 MG/ML
INJECTION, SOLUTION EPIDURAL; INFILTRATION; INTRACAUDAL; PERINEURAL
Status: DISPENSED
Start: 2020-06-18

## (undated) RX ORDER — CEFAZOLIN SODIUM 2 G/50ML
SOLUTION INTRAVENOUS
Status: DISPENSED
Start: 2019-02-21

## (undated) RX ORDER — HEPARIN SODIUM,PORCINE 10 UNIT/ML
VIAL (ML) INTRAVENOUS
Status: DISPENSED
Start: 2020-08-11

## (undated) RX ORDER — OXYCODONE HYDROCHLORIDE 5 MG/1
TABLET ORAL
Status: DISPENSED
Start: 2020-06-18

## (undated) RX ORDER — SULFAMETHOXAZOLE/TRIMETHOPRIM 800-160 MG
TABLET ORAL
Status: DISPENSED
Start: 2020-08-10

## (undated) RX ORDER — BUPIVACAINE HYDROCHLORIDE 5 MG/ML
INJECTION, SOLUTION EPIDURAL; INTRACAUDAL
Status: DISPENSED
Start: 2020-06-18

## (undated) RX ORDER — LIDOCAINE HYDROCHLORIDE AND EPINEPHRINE 10; 10 MG/ML; UG/ML
INJECTION, SOLUTION INFILTRATION; PERINEURAL
Status: DISPENSED
Start: 2020-06-18

## (undated) RX ORDER — DEXAMETHASONE SODIUM PHOSPHATE 4 MG/ML
INJECTION, SOLUTION INTRA-ARTICULAR; INTRALESIONAL; INTRAMUSCULAR; INTRAVENOUS; SOFT TISSUE
Status: DISPENSED
Start: 2020-06-18

## (undated) RX ORDER — SIMETHICONE 40MG/0.6ML
SUSPENSION, DROPS(FINAL DOSAGE FORM)(ML) ORAL
Status: DISPENSED
Start: 2020-08-13

## (undated) RX ORDER — DEXAMETHASONE SODIUM PHOSPHATE 4 MG/ML
INJECTION, SOLUTION INTRA-ARTICULAR; INTRALESIONAL; INTRAMUSCULAR; INTRAVENOUS; SOFT TISSUE
Status: DISPENSED
Start: 2019-02-21

## (undated) RX ORDER — HEPARIN SODIUM,PORCINE 10 UNIT/ML
VIAL (ML) INTRAVENOUS
Status: DISPENSED
Start: 2020-08-12

## (undated) RX ORDER — SIMETHICONE 40MG/0.6ML
SUSPENSION, DROPS(FINAL DOSAGE FORM)(ML) ORAL
Status: DISPENSED
Start: 2020-08-11

## (undated) RX ORDER — SCOLOPAMINE TRANSDERMAL SYSTEM 1 MG/1
PATCH, EXTENDED RELEASE TRANSDERMAL
Status: DISPENSED
Start: 2020-06-18

## (undated) RX ORDER — ERYTHROMYCIN 5 MG/G
OINTMENT OPHTHALMIC
Status: DISPENSED
Start: 2020-06-18

## (undated) RX ORDER — ONDANSETRON 2 MG/ML
INJECTION INTRAMUSCULAR; INTRAVENOUS
Status: DISPENSED
Start: 2023-06-08

## (undated) RX ORDER — ONDANSETRON 2 MG/ML
INJECTION INTRAMUSCULAR; INTRAVENOUS
Status: DISPENSED
Start: 2019-02-21

## (undated) RX ORDER — GLYCOPYRROLATE 0.2 MG/ML
INJECTION INTRAMUSCULAR; INTRAVENOUS
Status: DISPENSED
Start: 2020-06-18

## (undated) RX ORDER — LIDOCAINE HYDROCHLORIDE AND EPINEPHRINE 15; 5 MG/ML; UG/ML
INJECTION, SOLUTION EPIDURAL
Status: DISPENSED
Start: 2019-02-21

## (undated) RX ORDER — LIDOCAINE HYDROCHLORIDE 20 MG/ML
INJECTION, SOLUTION EPIDURAL; INFILTRATION; INTRACAUDAL; PERINEURAL
Status: DISPENSED
Start: 2019-02-21

## (undated) RX ORDER — DEXAMETHASONE SODIUM PHOSPHATE 10 MG/ML
INJECTION, SOLUTION INTRAMUSCULAR; INTRAVENOUS
Status: DISPENSED
Start: 2020-06-18

## (undated) RX ORDER — PROPOFOL 10 MG/ML
INJECTION, EMULSION INTRAVENOUS
Status: DISPENSED
Start: 2019-02-21

## (undated) RX ORDER — KETOROLAC TROMETHAMINE 30 MG/ML
INJECTION, SOLUTION INTRAMUSCULAR; INTRAVENOUS
Status: DISPENSED
Start: 2019-02-21

## (undated) RX ORDER — LIDOCAINE HYDROCHLORIDE 20 MG/ML
JELLY TOPICAL
Status: DISPENSED
Start: 2020-08-28

## (undated) RX ORDER — FENTANYL CITRATE 50 UG/ML
INJECTION, SOLUTION INTRAMUSCULAR; INTRAVENOUS
Status: DISPENSED
Start: 2023-06-08

## (undated) RX ORDER — ONDANSETRON 2 MG/ML
INJECTION INTRAMUSCULAR; INTRAVENOUS
Status: DISPENSED
Start: 2020-06-18

## (undated) RX ORDER — KETOROLAC TROMETHAMINE 30 MG/ML
INJECTION, SOLUTION INTRAMUSCULAR; INTRAVENOUS
Status: DISPENSED
Start: 2020-06-18

## (undated) RX ORDER — LIDOCAINE HYDROCHLORIDE AND EPINEPHRINE 15; 5 MG/ML; UG/ML
INJECTION, SOLUTION EPIDURAL
Status: DISPENSED
Start: 2020-06-18

## (undated) RX ORDER — ACETAMINOPHEN 325 MG/1
TABLET ORAL
Status: DISPENSED
Start: 2020-06-18

## (undated) RX ORDER — FENTANYL CITRATE 50 UG/ML
INJECTION, SOLUTION INTRAMUSCULAR; INTRAVENOUS
Status: DISPENSED
Start: 2019-02-21

## (undated) RX ORDER — EPHEDRINE SULFATE 50 MG/ML
INJECTION, SOLUTION INTRAMUSCULAR; INTRAVENOUS; SUBCUTANEOUS
Status: DISPENSED
Start: 2020-06-18

## (undated) RX ORDER — SIMETHICONE 40MG/0.6ML
SUSPENSION, DROPS(FINAL DOSAGE FORM)(ML) ORAL
Status: DISPENSED
Start: 2020-08-14

## (undated) RX ORDER — HEPARIN SODIUM (PORCINE) LOCK FLUSH IV SOLN 100 UNIT/ML 100 UNIT/ML
SOLUTION INTRAVENOUS
Status: DISPENSED
Start: 2020-08-11

## (undated) RX ORDER — ACETAMINOPHEN 325 MG/1
TABLET ORAL
Status: DISPENSED
Start: 2019-02-21

## (undated) RX ORDER — BALANCED SALT SOLUTION 6.4; .75; .48; .3; 3.9; 1.7 MG/ML; MG/ML; MG/ML; MG/ML; MG/ML; MG/ML
SOLUTION OPHTHALMIC
Status: DISPENSED
Start: 2020-06-18

## (undated) RX ORDER — HEPARIN SODIUM (PORCINE) LOCK FLUSH IV SOLN 100 UNIT/ML 100 UNIT/ML
SOLUTION INTRAVENOUS
Status: DISPENSED
Start: 2020-08-12

## (undated) RX ORDER — FENTANYL CITRATE 50 UG/ML
INJECTION, SOLUTION INTRAMUSCULAR; INTRAVENOUS
Status: DISPENSED
Start: 2020-06-18

## (undated) RX ORDER — BUPIVACAINE HYDROCHLORIDE 5 MG/ML
INJECTION, SOLUTION EPIDURAL; INTRACAUDAL
Status: DISPENSED
Start: 2019-02-21